# Patient Record
Sex: FEMALE | Race: WHITE | NOT HISPANIC OR LATINO | URBAN - METROPOLITAN AREA
[De-identification: names, ages, dates, MRNs, and addresses within clinical notes are randomized per-mention and may not be internally consistent; named-entity substitution may affect disease eponyms.]

---

## 2019-11-05 ENCOUNTER — INPATIENT (INPATIENT)
Facility: HOSPITAL | Age: 84
LOS: 2 days | Discharge: NURSING, REHAB OR LONG-TERM CARE | End: 2019-11-08
Attending: OBSTETRICS & GYNECOLOGY | Admitting: OBSTETRICS & GYNECOLOGY
Payer: MEDICARE

## 2019-11-05 ENCOUNTER — OUTPATIENT (OUTPATIENT)
Dept: OUTPATIENT SERVICES | Facility: HOSPITAL | Age: 84
LOS: 1 days | End: 2019-11-05

## 2019-11-05 ENCOUNTER — RESULT REVIEW (OUTPATIENT)
Age: 84
End: 2019-11-05

## 2019-11-05 ENCOUNTER — APPOINTMENT (OUTPATIENT)
Dept: OBGYN | Facility: HOSPITAL | Age: 84
End: 2019-11-05
Payer: MEDICARE

## 2019-11-05 VITALS
HEART RATE: 65 BPM | DIASTOLIC BLOOD PRESSURE: 61 MMHG | TEMPERATURE: 98 F | SYSTOLIC BLOOD PRESSURE: 176 MMHG | RESPIRATION RATE: 20 BRPM | OXYGEN SATURATION: 99 %

## 2019-11-05 VITALS
DIASTOLIC BLOOD PRESSURE: 57 MMHG | BODY MASS INDEX: 36.14 KG/M2 | WEIGHT: 204 LBS | HEIGHT: 63 IN | SYSTOLIC BLOOD PRESSURE: 158 MMHG | HEART RATE: 59 BPM

## 2019-11-05 DIAGNOSIS — N93.9 ABNORMAL UTERINE AND VAGINAL BLEEDING, UNSPECIFIED: ICD-10-CM

## 2019-11-05 DIAGNOSIS — N88.8 OTHER SPECIFIED NONINFLAMMATORY DISORDERS OF CERVIX UTERI: ICD-10-CM

## 2019-11-05 DIAGNOSIS — N95.0 POSTMENOPAUSAL BLEEDING: ICD-10-CM

## 2019-11-05 LAB
ALBUMIN SERPL ELPH-MCNC: 3.6 G/DL — SIGNIFICANT CHANGE UP (ref 3.3–5)
ALP SERPL-CCNC: 78 U/L — SIGNIFICANT CHANGE UP (ref 40–120)
ALT FLD-CCNC: 7 U/L — SIGNIFICANT CHANGE UP (ref 4–33)
ANION GAP SERPL CALC-SCNC: 13 MMO/L — SIGNIFICANT CHANGE UP (ref 7–14)
APTT BLD: 34.2 SEC — SIGNIFICANT CHANGE UP (ref 27.5–36.3)
AST SERPL-CCNC: 11 U/L — SIGNIFICANT CHANGE UP (ref 4–32)
BASOPHILS # BLD AUTO: 0.02 K/UL — SIGNIFICANT CHANGE UP (ref 0–0.2)
BASOPHILS NFR BLD AUTO: 0.2 % — SIGNIFICANT CHANGE UP (ref 0–2)
BILIRUB SERPL-MCNC: 0.4 MG/DL — SIGNIFICANT CHANGE UP (ref 0.2–1.2)
BLD GP AB SCN SERPL QL: NEGATIVE — SIGNIFICANT CHANGE UP
BUN SERPL-MCNC: 23 MG/DL — SIGNIFICANT CHANGE UP (ref 7–23)
CALCIUM SERPL-MCNC: 9.1 MG/DL — SIGNIFICANT CHANGE UP (ref 8.4–10.5)
CHLORIDE SERPL-SCNC: 99 MMOL/L — SIGNIFICANT CHANGE UP (ref 98–107)
CO2 SERPL-SCNC: 25 MMOL/L — SIGNIFICANT CHANGE UP (ref 22–31)
CREAT SERPL-MCNC: 0.92 MG/DL — SIGNIFICANT CHANGE UP (ref 0.5–1.3)
EOSINOPHIL # BLD AUTO: 0.03 K/UL — SIGNIFICANT CHANGE UP (ref 0–0.5)
EOSINOPHIL NFR BLD AUTO: 0.4 % — SIGNIFICANT CHANGE UP (ref 0–6)
GLUCOSE SERPL-MCNC: 333 MG/DL — HIGH (ref 70–99)
HCT VFR BLD CALC: 42.1 % — SIGNIFICANT CHANGE UP (ref 34.5–45)
HGB BLD-MCNC: 13.4 G/DL — SIGNIFICANT CHANGE UP (ref 11.5–15.5)
IMM GRANULOCYTES NFR BLD AUTO: 0.5 % — SIGNIFICANT CHANGE UP (ref 0–1.5)
INR BLD: 1.06 — SIGNIFICANT CHANGE UP (ref 0.88–1.17)
LYMPHOCYTES # BLD AUTO: 1.62 K/UL — SIGNIFICANT CHANGE UP (ref 1–3.3)
LYMPHOCYTES # BLD AUTO: 19.1 % — SIGNIFICANT CHANGE UP (ref 13–44)
MAGNESIUM SERPL-MCNC: 2 MG/DL — SIGNIFICANT CHANGE UP (ref 1.6–2.6)
MCHC RBC-ENTMCNC: 28.3 PG — SIGNIFICANT CHANGE UP (ref 27–34)
MCHC RBC-ENTMCNC: 31.8 % — LOW (ref 32–36)
MCV RBC AUTO: 88.8 FL — SIGNIFICANT CHANGE UP (ref 80–100)
MONOCYTES # BLD AUTO: 0.54 K/UL — SIGNIFICANT CHANGE UP (ref 0–0.9)
MONOCYTES NFR BLD AUTO: 6.4 % — SIGNIFICANT CHANGE UP (ref 2–14)
NEUTROPHILS # BLD AUTO: 6.22 K/UL — SIGNIFICANT CHANGE UP (ref 1.8–7.4)
NEUTROPHILS NFR BLD AUTO: 73.4 % — SIGNIFICANT CHANGE UP (ref 43–77)
NRBC # FLD: 0 K/UL — SIGNIFICANT CHANGE UP (ref 0–0)
PHOSPHATE SERPL-MCNC: 3.2 MG/DL — SIGNIFICANT CHANGE UP (ref 2.5–4.5)
PLATELET # BLD AUTO: 232 K/UL — SIGNIFICANT CHANGE UP (ref 150–400)
PMV BLD: 10.4 FL — SIGNIFICANT CHANGE UP (ref 7–13)
POTASSIUM SERPL-MCNC: 4.7 MMOL/L — SIGNIFICANT CHANGE UP (ref 3.5–5.3)
POTASSIUM SERPL-SCNC: 4.7 MMOL/L — SIGNIFICANT CHANGE UP (ref 3.5–5.3)
PROT SERPL-MCNC: 6.8 G/DL — SIGNIFICANT CHANGE UP (ref 6–8.3)
PROTHROM AB SERPL-ACNC: 11.8 SEC — SIGNIFICANT CHANGE UP (ref 9.8–13.1)
RBC # BLD: 4.74 M/UL — SIGNIFICANT CHANGE UP (ref 3.8–5.2)
RBC # FLD: 15.2 % — HIGH (ref 10.3–14.5)
RH IG SCN BLD-IMP: POSITIVE — SIGNIFICANT CHANGE UP
SODIUM SERPL-SCNC: 137 MMOL/L — SIGNIFICANT CHANGE UP (ref 135–145)
WBC # BLD: 8.47 K/UL — SIGNIFICANT CHANGE UP (ref 3.8–10.5)
WBC # FLD AUTO: 8.47 K/UL — SIGNIFICANT CHANGE UP (ref 3.8–10.5)

## 2019-11-05 PROCEDURE — 88360 TUMOR IMMUNOHISTOCHEM/MANUAL: CPT | Mod: 26

## 2019-11-05 PROCEDURE — 88341 IMHCHEM/IMCYTCHM EA ADD ANTB: CPT | Mod: 26,59

## 2019-11-05 PROCEDURE — 88342 IMHCHEM/IMCYTCHM 1ST ANTB: CPT | Mod: 26,59

## 2019-11-05 PROCEDURE — 88305 TISSUE EXAM BY PATHOLOGIST: CPT | Mod: 26

## 2019-11-05 PROCEDURE — ZZZZZ: CPT

## 2019-11-05 PROCEDURE — 99213 OFFICE O/P EST LOW 20 MIN: CPT | Mod: GC

## 2019-11-05 RX ORDER — ACETAMINOPHEN 500 MG
1000 TABLET ORAL ONCE
Refills: 0 | Status: COMPLETED | OUTPATIENT
Start: 2019-11-05 | End: 2019-11-05

## 2019-11-05 RX ORDER — GLUCAGON INJECTION, SOLUTION 0.5 MG/.1ML
1 INJECTION, SOLUTION SUBCUTANEOUS ONCE
Refills: 0 | Status: DISCONTINUED | OUTPATIENT
Start: 2019-11-05 | End: 2019-11-08

## 2019-11-05 RX ORDER — SODIUM CHLORIDE 9 MG/ML
1000 INJECTION, SOLUTION INTRAVENOUS
Refills: 0 | Status: DISCONTINUED | OUTPATIENT
Start: 2019-11-05 | End: 2019-11-08

## 2019-11-05 RX ORDER — DEXTROSE 50 % IN WATER 50 %
15 SYRINGE (ML) INTRAVENOUS ONCE
Refills: 0 | Status: DISCONTINUED | OUTPATIENT
Start: 2019-11-05 | End: 2019-11-08

## 2019-11-05 RX ORDER — ACETAMINOPHEN 500 MG
975 TABLET ORAL EVERY 6 HOURS
Refills: 0 | Status: DISCONTINUED | OUTPATIENT
Start: 2019-11-05 | End: 2019-11-08

## 2019-11-05 RX ORDER — METOPROLOL TARTRATE 50 MG
5 TABLET ORAL EVERY 6 HOURS
Refills: 0 | Status: DISCONTINUED | OUTPATIENT
Start: 2019-11-05 | End: 2019-11-06

## 2019-11-05 RX ORDER — INSULIN LISPRO 100/ML
VIAL (ML) SUBCUTANEOUS AT BEDTIME
Refills: 0 | Status: DISCONTINUED | OUTPATIENT
Start: 2019-11-05 | End: 2019-11-06

## 2019-11-05 RX ORDER — DEXTROSE 50 % IN WATER 50 %
25 SYRINGE (ML) INTRAVENOUS ONCE
Refills: 0 | Status: DISCONTINUED | OUTPATIENT
Start: 2019-11-05 | End: 2019-11-08

## 2019-11-05 RX ORDER — INSULIN LISPRO 100/ML
VIAL (ML) SUBCUTANEOUS
Refills: 0 | Status: DISCONTINUED | OUTPATIENT
Start: 2019-11-05 | End: 2019-11-06

## 2019-11-05 RX ORDER — INSULIN LISPRO 100/ML
VIAL (ML) SUBCUTANEOUS AT BEDTIME
Refills: 0 | Status: DISCONTINUED | OUTPATIENT
Start: 2019-11-05 | End: 2019-11-05

## 2019-11-05 RX ORDER — INSULIN LISPRO 100/ML
VIAL (ML) SUBCUTANEOUS EVERY 6 HOURS
Refills: 0 | Status: DISCONTINUED | OUTPATIENT
Start: 2019-11-05 | End: 2019-11-05

## 2019-11-05 RX ORDER — SODIUM CHLORIDE 9 MG/ML
1000 INJECTION, SOLUTION INTRAVENOUS
Refills: 0 | Status: DISCONTINUED | OUTPATIENT
Start: 2019-11-05 | End: 2019-11-07

## 2019-11-05 RX ORDER — DEXTROSE 50 % IN WATER 50 %
12.5 SYRINGE (ML) INTRAVENOUS ONCE
Refills: 0 | Status: DISCONTINUED | OUTPATIENT
Start: 2019-11-05 | End: 2019-11-08

## 2019-11-05 RX ORDER — INSULIN LISPRO 100/ML
VIAL (ML) SUBCUTANEOUS
Refills: 0 | Status: DISCONTINUED | OUTPATIENT
Start: 2019-11-05 | End: 2019-11-05

## 2019-11-05 RX ADMIN — Medication 400 MILLIGRAM(S): at 23:04

## 2019-11-05 RX ADMIN — SODIUM CHLORIDE 75 MILLILITER(S): 9 INJECTION, SOLUTION INTRAVENOUS at 21:55

## 2019-11-05 NOTE — ED PROVIDER NOTE - OBJECTIVE STATEMENT
89F with PMH T2DM, HTN, 6mo of postmenopausal bleeding who p/w vaginal bleeding following endometrial biopsy. pt saw gyn today for evaluation of bleeding, following bx pt noted bright blood in the toilet bowl. Pt sent to ED for further eval. pt denies dizziness, chest pain, sob, nausea, vomiting, fevers, chills. Pt immediately seen by obgyn/gynonc team on arrival for exam and evaluation. Denies ASA,, plavix, blood thinner use.

## 2019-11-05 NOTE — REVIEW OF SYSTEMS
[Pelvic Pain] : pelvic pain [Abn Vag Bleeding] : abnormal vaginal bleeding [Frequency] : frequency [Nl] : Integumentary

## 2019-11-05 NOTE — ED PROVIDER NOTE - PHYSICAL EXAMINATION
Gen: NAD, AOx3  Head: NCAT  HEENT: PERRL, oral mucosa moist, normal conjunctiva, oropharynx clear without exudate or erythema  Lung: CTAB, no respiratory distress, no wheezing, rales, rhonchi  CV: normal s1/s2, rrr, no murmurs, Normal perfusion, pulses 2+ throughout  Abd: soft, NTND, no CVA tenderness  MSK: No edema, no visible deformities, full range of motion in all 4 extremities  Neuro: CN II-XII grossly intact, No focal neurologic deficits  Skin: No rash   Psych: normal affect  : vaginal exam performed by obgyn team tha Gen: NAD, AOx3  Head: NCAT  HEENT: PERRL, oral mucosa moist, normal conjunctiva  Lung: CTAB, no respiratory distress, no wheezing, rales, rhonchi  CV: normal s1/s2, rrr, no murmurs, Normal perfusion  Abd: soft, NTND, no CVA tenderness  MSK: 2+ edema b/l LE no visible deformities, full range of motion in all 4 extremities  Neuro: CN II-XII grossly intact, No focal neurologic deficits  Skin: No rash   Psych: normal affect  : vaginal exam performed by obgyn team Dr. Lozada; active bleeding with clots noted; packing placed by obgyn

## 2019-11-05 NOTE — ED ADULT NURSE NOTE - NSIMPLEMENTINTERV_GEN_ALL_ED
Implemented All Universal Safety Interventions:  [place] to call system. Call bell, personal items and telephone within reach. Instruct patient to call for assistance. Room bathroom lighting operational. Non-slip footwear when patient is off stretcher. Physically safe environment: no spills, clutter or unnecessary equipment. Stretcher in lowest position, wheels locked, appropriate side rails in place. Implemented All Fall with Harm Risk Interventions:  [place] to call system. Call bell, personal items and telephone within reach. Instruct patient to call for assistance. Room bathroom lighting operational. Non-slip footwear when patient is off stretcher. Physically safe environment: no spills, clutter or unnecessary equipment. Stretcher in lowest position, wheels locked, appropriate side rails in place. Provide visual cue, wrist band, yellow gown, etc. Monitor gait and stability. Monitor for mental status changes and reorient to person, place, and time. Review medications for side effects contributing to fall risk. Reinforce activity limits and safety measures with patient and family. Provide visual clues: red socks.

## 2019-11-05 NOTE — ED PROVIDER NOTE - PMH
Benign Hypertension    Breast Cancer  at 50 yo and 38 yo, bilateral mastectomy  CAD (coronary artery disease)    CVA (Cerebral Infarction)  1997 - no residual symptoms  Diabetes Mellitus    History of Obesity    HTN (hypertension)

## 2019-11-05 NOTE — H&P ADULT - PROBLEM SELECTOR PLAN 1
- Vaginal packing and grullon catheter placed at bedside  - CBC, PT/INR, PTT, Fibrinogen, BMP w/ Mg & Phos STAT  - Pending Cr, CT Abdomen/Pelvis with IV contrast and CT Chest w/o contrast to evaluate  - NPO  - IV Lopressor for HTN; ISS for T2DM  - f/u outpatient biopsy results    Pt seen and examined with Neo, PGY-5 Gyn Onc Fellow; d/w Dr. Yosvany Kirkland, PGY-4  #[id number] - Vaginal packing and grullon catheter placed at bedside  - CBC, PT/INR, PTT, Fibrinogen, BMP w/ Mg & Phos, T&S STAT  - Pending Cr, CT Abdomen/Pelvis with IV contrast and CT Chest w/o contrast to evaluate  - NPO  - IV Lopressor for HTN; ISS for T2DM  - f/u outpatient biopsy results    Pt seen and examined with Neo, PGY-5 Gyn Onc Fellow; d/w Dr. Yosvany Kirkland, PGY-4  #[id number] - Vaginal packing and grullon catheter placed at bedside  - CBC, PT/INR, PTT, BMP w/ Mg & Phos, T&S STAT  - Pending Cr, CT Abdomen/Pelvis with IV contrast and CT Chest w/o contrast to evaluate  - NPO  - IV Lopressor for HTN; ISS for T2DM  - f/u outpatient biopsy results  - AM CBC/BMP  - SCDs for DVT ppx    Pt seen and examined with Neo, PGY-5 Gyn Onc Fellow; d/w Dr. Yosvany Kirkland, PGY-4  #[id number]

## 2019-11-05 NOTE — ED ADULT NURSE NOTE - NSFALLRSKPASTHIST_ED_ALL_ED
-Psychiatry consulted  -Constant observation ordered no -Psychiatry consulted  -Constant observation ordered

## 2019-11-05 NOTE — CHART NOTE - NSCHARTNOTEFT_GEN_A_CORE
Patient seen and examined for continued bleeding. Pt asymptomatic    VS  T(C): 36.6 (11-05-19 @ 20:50)  HR: 74 (11-05-19 @ 21:15)  BP: 188/70 (11-05-19 @ 21:15)  RR: 18 (11-05-19 @ 21:15)  SpO2: 100% (11-05-19 @ 21:15)    Pelvic Exam: Saturated vaginal packing with bleeding around packing onto bharat, roughly 200 mL in total.    P:  - Vaginal packing removed, Surgicel placed over mass, and 1.5x vaginal packing re-inserted (plan was for 2x vaginal packing but pt could not tolerate full 2nd packing strip).  - CBC at 12a  - Re-assess bleeding in 1-2 hours    Pt seen and examined with Neo, PGY-5 Gyn Onc Fellow  Mati Kirkland, PGY-4  #[id number] Patient seen and examined for continued bleeding. Pt asymptomatic    VS  T(C): 36.6 (11-05-19 @ 20:50)  HR: 74 (11-05-19 @ 21:15)  BP: 188/70 (11-05-19 @ 21:15)  RR: 18 (11-05-19 @ 21:15)  SpO2: 100% (11-05-19 @ 21:15)    Pelvic Exam: Saturated vaginal packing with bleeding around packing onto bharat, roughly 200 mL in total.               13.4   8.47  )-----------( 232      ( 11-05 @ 20:45 )             42.1     P:  - Vaginal packing removed, Surgicel placed over mass, and 1.5x vaginal packing re-inserted (plan was for 2x vaginal packing but pt could not tolerate full 2nd packing strip).  - CBC at 12a  - Re-assess bleeding in 1-2 hours    Pt seen and examined with Neo PGY-5 Gyn Onc Fellow  Mati Kirkland, PGY-4  #[id number]

## 2019-11-05 NOTE — ED PROVIDER NOTE - PSH
Ankle Injury  left ankle surgery  Fracture of shaft of tibia and fibula  right  History of Mastectomy  1969 and 1979  S/P PTCA (percutaneous transluminal coronary angioplasty)

## 2019-11-05 NOTE — H&P ADULT - NSHPPHYSICALEXAM_GEN_ALL_CORE
VS  T(C): 36.4 (11-05-19 @ 19:01)  HR: 65 (11-05-19 @ 19:01)  BP: 176/61 (11-05-19 @ 19:01)  RR: 20 (11-05-19 @ 19:01)  SpO2: 99% (11-05-19 @ 19:01)    Physical Exam:  General: NAD  Abdomen: Soft, non-tender, non-distended  Pelvic: Blood filled vaginal vault with bright red bleeding, mass noted at vaginal apex and palpated on bimanual on posterior portion of the cervix. Exact source of bleeding could not be localized but appeared to be emanating from the mass. Roughly 150 mL of blood and clot removed from vagina.

## 2019-11-05 NOTE — ED PROVIDER NOTE - CLINICAL SUMMARY MEDICAL DECISION MAKING FREE TEXT BOX
89F with postmenopausal bleeding with worsening vaginal bleed after endometrial biopsy today; packing placed by obgyn team. Will check CBC CMP T&S and coags. Admit to obgyn

## 2019-11-05 NOTE — H&P ADULT - HISTORY OF PRESENT ILLNESS
89 y/o G0 LMP 1979 presenting from Gyn clinic for significant vaginal bleeding. Pt originally presented to gyn clinic with postmenopausal bleeding that she began to note over the past year. She had 6 episodes during this time period, most recently 10/31-11/3. Bleeding is initially heavy with cramping and then decreases to spotting. She had a CT scan 18 years ago that showed a mass in her uterus that she was supposed to undergo a hysterectomy for but the patient reports that the case was aborted when she was on the operating table because "her numbers were wacky." Patient reports she had a TAUS earlier this year at her nursing home that was reportedly normal per the patient. In gyn clinic today on exam a "4-5 cm amorphous mass was noted to be emanating from the posterior vagina, possibly from the cervix" A portion mass was removed and sent for tissue biopsy which resulted in brisk bleeding from the mass. Pressure was applied and bleeding subsided, vitals were stable, but when pt when to go use the restroom she had been noted to soak through 1-2 pads. Pt was still asymptomatic and vitals stable but due to continued bleeding she was advised to come to the emergency room. Here in the ED she reports some dizziness, but otherwise denies symptoms including CP, SOB, N/V, palpitations.  POBhx: G0  PGynhx: "mass in uterus", only had a few pap smears in her lifetime  PMH: Breast cancer 1968, 1978 s/p R radical mastectomy and L modified mastectomy, uncontrolled T2DM c/b neuropathy and recurrent non-healing ulcers of LE, anxiety, HTN, PVD, GERD, overactive bladder, OA, cardiac stent  PSH: none  FH: denies family history of breast, ovarian, uterine, cervical, colon cancer  Meds: does not know the names  All: none  Social: Lives in a nursing home, used to smoke 3 packs/day for 50 years

## 2019-11-05 NOTE — H&P ADULT - ASSESSMENT
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

## 2019-11-05 NOTE — ED ADULT NURSE NOTE - OBJECTIVE STATEMENT
89 yo F AAOx4 received to room 9 for intermittent vag bleeding x a few months, reports in the last 2 weeks its been worse, + clots, denies dizziness/weakness, resides in NH, +3 pitting edema to b/l LE, 14 fr grullon placed by OBGYN resident with clear, yellow urine draining to gravity at bedside, VS as charted, denies any pain, 20G placed to R wrist, lab work sent, no further interventions at this time, will monitor

## 2019-11-06 DIAGNOSIS — N95.0 POSTMENOPAUSAL BLEEDING: ICD-10-CM

## 2019-11-06 LAB
ANION GAP SERPL CALC-SCNC: 8 MMO/L — SIGNIFICANT CHANGE UP (ref 7–14)
BASOPHILS # BLD AUTO: 0.03 K/UL — SIGNIFICANT CHANGE UP (ref 0–0.2)
BASOPHILS # BLD AUTO: 0.03 K/UL — SIGNIFICANT CHANGE UP (ref 0–0.2)
BASOPHILS NFR BLD AUTO: 0.3 % — SIGNIFICANT CHANGE UP (ref 0–2)
BASOPHILS NFR BLD AUTO: 0.3 % — SIGNIFICANT CHANGE UP (ref 0–2)
BUN SERPL-MCNC: 22 MG/DL — SIGNIFICANT CHANGE UP (ref 7–23)
CALCIUM SERPL-MCNC: 9.2 MG/DL — SIGNIFICANT CHANGE UP (ref 8.4–10.5)
CHLORIDE SERPL-SCNC: 100 MMOL/L — SIGNIFICANT CHANGE UP (ref 98–107)
CO2 SERPL-SCNC: 29 MMOL/L — SIGNIFICANT CHANGE UP (ref 22–31)
CREAT SERPL-MCNC: 0.8 MG/DL — SIGNIFICANT CHANGE UP (ref 0.5–1.3)
EOSINOPHIL # BLD AUTO: 0.04 K/UL — SIGNIFICANT CHANGE UP (ref 0–0.5)
EOSINOPHIL # BLD AUTO: 0.05 K/UL — SIGNIFICANT CHANGE UP (ref 0–0.5)
EOSINOPHIL NFR BLD AUTO: 0.4 % — SIGNIFICANT CHANGE UP (ref 0–6)
EOSINOPHIL NFR BLD AUTO: 0.5 % — SIGNIFICANT CHANGE UP (ref 0–6)
GLUCOSE SERPL-MCNC: 229 MG/DL — HIGH (ref 70–99)
HCT VFR BLD CALC: 35.6 % — SIGNIFICANT CHANGE UP (ref 34.5–45)
HCT VFR BLD CALC: 36.5 % — SIGNIFICANT CHANGE UP (ref 34.5–45)
HCT VFR BLD CALC: 39.2 % — SIGNIFICANT CHANGE UP (ref 34.5–45)
HGB BLD-MCNC: 11 G/DL — LOW (ref 11.5–15.5)
HGB BLD-MCNC: 11.7 G/DL — SIGNIFICANT CHANGE UP (ref 11.5–15.5)
HGB BLD-MCNC: 11.9 G/DL — SIGNIFICANT CHANGE UP (ref 11.5–15.5)
IMM GRANULOCYTES NFR BLD AUTO: 0.5 % — SIGNIFICANT CHANGE UP (ref 0–1.5)
IMM GRANULOCYTES NFR BLD AUTO: 0.6 % — SIGNIFICANT CHANGE UP (ref 0–1.5)
LYMPHOCYTES # BLD AUTO: 1.53 K/UL — SIGNIFICANT CHANGE UP (ref 1–3.3)
LYMPHOCYTES # BLD AUTO: 1.58 K/UL — SIGNIFICANT CHANGE UP (ref 1–3.3)
LYMPHOCYTES # BLD AUTO: 14.6 % — SIGNIFICANT CHANGE UP (ref 13–44)
LYMPHOCYTES # BLD AUTO: 15.7 % — SIGNIFICANT CHANGE UP (ref 13–44)
MAGNESIUM SERPL-MCNC: 2 MG/DL — SIGNIFICANT CHANGE UP (ref 1.6–2.6)
MCHC RBC-ENTMCNC: 27.5 PG — SIGNIFICANT CHANGE UP (ref 27–34)
MCHC RBC-ENTMCNC: 27.7 PG — SIGNIFICANT CHANGE UP (ref 27–34)
MCHC RBC-ENTMCNC: 28.7 PG — SIGNIFICANT CHANGE UP (ref 27–34)
MCHC RBC-ENTMCNC: 30.4 % — LOW (ref 32–36)
MCHC RBC-ENTMCNC: 30.9 % — LOW (ref 32–36)
MCHC RBC-ENTMCNC: 32.1 % — SIGNIFICANT CHANGE UP (ref 32–36)
MCV RBC AUTO: 89.5 FL — SIGNIFICANT CHANGE UP (ref 80–100)
MCV RBC AUTO: 89.7 FL — SIGNIFICANT CHANGE UP (ref 80–100)
MCV RBC AUTO: 90.7 FL — SIGNIFICANT CHANGE UP (ref 80–100)
MONOCYTES # BLD AUTO: 0.68 K/UL — SIGNIFICANT CHANGE UP (ref 0–0.9)
MONOCYTES # BLD AUTO: 0.88 K/UL — SIGNIFICANT CHANGE UP (ref 0–0.9)
MONOCYTES NFR BLD AUTO: 7 % — SIGNIFICANT CHANGE UP (ref 2–14)
MONOCYTES NFR BLD AUTO: 8.1 % — SIGNIFICANT CHANGE UP (ref 2–14)
NEUTROPHILS # BLD AUTO: 7.42 K/UL — HIGH (ref 1.8–7.4)
NEUTROPHILS # BLD AUTO: 8.25 K/UL — HIGH (ref 1.8–7.4)
NEUTROPHILS NFR BLD AUTO: 75.9 % — SIGNIFICANT CHANGE UP (ref 43–77)
NEUTROPHILS NFR BLD AUTO: 76.1 % — SIGNIFICANT CHANGE UP (ref 43–77)
NRBC # FLD: 0 K/UL — SIGNIFICANT CHANGE UP (ref 0–0)
PHOSPHATE SERPL-MCNC: 3 MG/DL — SIGNIFICANT CHANGE UP (ref 2.5–4.5)
PLATELET # BLD AUTO: 203 K/UL — SIGNIFICANT CHANGE UP (ref 150–400)
PLATELET # BLD AUTO: 211 K/UL — SIGNIFICANT CHANGE UP (ref 150–400)
PLATELET # BLD AUTO: 213 K/UL — SIGNIFICANT CHANGE UP (ref 150–400)
PMV BLD: 10.2 FL — SIGNIFICANT CHANGE UP (ref 7–13)
PMV BLD: 10.4 FL — SIGNIFICANT CHANGE UP (ref 7–13)
PMV BLD: 10.8 FL — SIGNIFICANT CHANGE UP (ref 7–13)
POTASSIUM SERPL-MCNC: 5 MMOL/L — SIGNIFICANT CHANGE UP (ref 3.5–5.3)
POTASSIUM SERPL-SCNC: 5 MMOL/L — SIGNIFICANT CHANGE UP (ref 3.5–5.3)
RBC # BLD: 3.97 M/UL — SIGNIFICANT CHANGE UP (ref 3.8–5.2)
RBC # BLD: 4.08 M/UL — SIGNIFICANT CHANGE UP (ref 3.8–5.2)
RBC # BLD: 4.32 M/UL — SIGNIFICANT CHANGE UP (ref 3.8–5.2)
RBC # FLD: 15.2 % — HIGH (ref 10.3–14.5)
RBC # FLD: 15.2 % — HIGH (ref 10.3–14.5)
RBC # FLD: 15.3 % — HIGH (ref 10.3–14.5)
SODIUM SERPL-SCNC: 137 MMOL/L — SIGNIFICANT CHANGE UP (ref 135–145)
WBC # BLD: 10.83 K/UL — HIGH (ref 3.8–10.5)
WBC # BLD: 13.77 K/UL — HIGH (ref 3.8–10.5)
WBC # BLD: 9.77 K/UL — SIGNIFICANT CHANGE UP (ref 3.8–10.5)
WBC # FLD AUTO: 10.83 K/UL — HIGH (ref 3.8–10.5)
WBC # FLD AUTO: 13.77 K/UL — HIGH (ref 3.8–10.5)
WBC # FLD AUTO: 9.77 K/UL — SIGNIFICANT CHANGE UP (ref 3.8–10.5)

## 2019-11-06 PROCEDURE — 71260 CT THORAX DX C+: CPT | Mod: 26

## 2019-11-06 PROCEDURE — 36247 INS CATH ABD/L-EXT ART 3RD: CPT

## 2019-11-06 PROCEDURE — 76937 US GUIDE VASCULAR ACCESS: CPT | Mod: 26

## 2019-11-06 PROCEDURE — 74177 CT ABD & PELVIS W/CONTRAST: CPT | Mod: 26

## 2019-11-06 PROCEDURE — 36248 INS CATH ABD/L-EXT ART ADDL: CPT

## 2019-11-06 PROCEDURE — 99233 SBSQ HOSP IP/OBS HIGH 50: CPT

## 2019-11-06 PROCEDURE — 37244 VASC EMBOLIZE/OCCLUDE BLEED: CPT

## 2019-11-06 RX ORDER — DEXTROSE 50 % IN WATER 50 %
25 SYRINGE (ML) INTRAVENOUS ONCE
Refills: 0 | Status: DISCONTINUED | OUTPATIENT
Start: 2019-11-06 | End: 2019-11-08

## 2019-11-06 RX ORDER — ONDANSETRON 8 MG/1
4 TABLET, FILM COATED ORAL EVERY 6 HOURS
Refills: 0 | Status: DISCONTINUED | OUTPATIENT
Start: 2019-11-06 | End: 2019-11-06

## 2019-11-06 RX ORDER — HYDRALAZINE HCL 50 MG
5 TABLET ORAL ONCE
Refills: 0 | Status: COMPLETED | OUTPATIENT
Start: 2019-11-06 | End: 2019-11-06

## 2019-11-06 RX ORDER — METOPROLOL TARTRATE 50 MG
5 TABLET ORAL EVERY 6 HOURS
Refills: 0 | Status: DISCONTINUED | OUTPATIENT
Start: 2019-11-06 | End: 2019-11-07

## 2019-11-06 RX ORDER — ONDANSETRON 8 MG/1
4 TABLET, FILM COATED ORAL ONCE
Refills: 0 | Status: DISCONTINUED | OUTPATIENT
Start: 2019-11-06 | End: 2019-11-08

## 2019-11-06 RX ORDER — INSULIN LISPRO 100/ML
VIAL (ML) SUBCUTANEOUS
Refills: 0 | Status: DISCONTINUED | OUTPATIENT
Start: 2019-11-06 | End: 2019-11-08

## 2019-11-06 RX ORDER — NALOXONE HYDROCHLORIDE 4 MG/.1ML
0.1 SPRAY NASAL
Refills: 0 | Status: DISCONTINUED | OUTPATIENT
Start: 2019-11-06 | End: 2019-11-06

## 2019-11-06 RX ORDER — INSULIN LISPRO 100/ML
VIAL (ML) SUBCUTANEOUS AT BEDTIME
Refills: 0 | Status: DISCONTINUED | OUTPATIENT
Start: 2019-11-06 | End: 2019-11-08

## 2019-11-06 RX ORDER — METOPROLOL TARTRATE 50 MG
5 TABLET ORAL ONCE
Refills: 0 | Status: COMPLETED | OUTPATIENT
Start: 2019-11-06 | End: 2019-11-06

## 2019-11-06 RX ORDER — INSULIN LISPRO 100/ML
4 VIAL (ML) SUBCUTANEOUS ONCE
Refills: 0 | Status: COMPLETED | OUTPATIENT
Start: 2019-11-06 | End: 2019-11-06

## 2019-11-06 RX ORDER — GLUCAGON INJECTION, SOLUTION 0.5 MG/.1ML
1 INJECTION, SOLUTION SUBCUTANEOUS ONCE
Refills: 0 | Status: DISCONTINUED | OUTPATIENT
Start: 2019-11-06 | End: 2019-11-08

## 2019-11-06 RX ORDER — DIPHENHYDRAMINE HCL 50 MG
25 CAPSULE ORAL EVERY 4 HOURS
Refills: 0 | Status: DISCONTINUED | OUTPATIENT
Start: 2019-11-06 | End: 2019-11-06

## 2019-11-06 RX ORDER — DIPHENHYDRAMINE HCL 50 MG
25 CAPSULE ORAL ONCE
Refills: 0 | Status: DISCONTINUED | OUTPATIENT
Start: 2019-11-06 | End: 2019-11-06

## 2019-11-06 RX ORDER — SODIUM CHLORIDE 9 MG/ML
1000 INJECTION, SOLUTION INTRAVENOUS
Refills: 0 | Status: DISCONTINUED | OUTPATIENT
Start: 2019-11-06 | End: 2019-11-08

## 2019-11-06 RX ORDER — HYDROMORPHONE HYDROCHLORIDE 2 MG/ML
0.5 INJECTION INTRAMUSCULAR; INTRAVENOUS; SUBCUTANEOUS
Refills: 0 | Status: DISCONTINUED | OUTPATIENT
Start: 2019-11-06 | End: 2019-11-06

## 2019-11-06 RX ORDER — DEXTROSE 50 % IN WATER 50 %
12.5 SYRINGE (ML) INTRAVENOUS ONCE
Refills: 0 | Status: DISCONTINUED | OUTPATIENT
Start: 2019-11-06 | End: 2019-11-08

## 2019-11-06 RX ORDER — DEXMEDETOMIDINE HYDROCHLORIDE IN 0.9% SODIUM CHLORIDE 4 UG/ML
0.5 INJECTION INTRAVENOUS
Qty: 200 | Refills: 0 | Status: DISCONTINUED | OUTPATIENT
Start: 2019-11-06 | End: 2019-11-06

## 2019-11-06 RX ORDER — INSULIN GLARGINE 100 [IU]/ML
5 INJECTION, SOLUTION SUBCUTANEOUS AT BEDTIME
Refills: 0 | Status: DISCONTINUED | OUTPATIENT
Start: 2019-11-06 | End: 2019-11-08

## 2019-11-06 RX ORDER — HYDROMORPHONE HYDROCHLORIDE 2 MG/ML
0.5 INJECTION INTRAMUSCULAR; INTRAVENOUS; SUBCUTANEOUS
Refills: 0 | Status: DISCONTINUED | OUTPATIENT
Start: 2019-11-06 | End: 2019-11-07

## 2019-11-06 RX ORDER — DEXTROSE 50 % IN WATER 50 %
15 SYRINGE (ML) INTRAVENOUS ONCE
Refills: 0 | Status: DISCONTINUED | OUTPATIENT
Start: 2019-11-06 | End: 2019-11-08

## 2019-11-06 RX ORDER — HYDROMORPHONE HYDROCHLORIDE 2 MG/ML
30 INJECTION INTRAMUSCULAR; INTRAVENOUS; SUBCUTANEOUS
Refills: 0 | Status: DISCONTINUED | OUTPATIENT
Start: 2019-11-06 | End: 2019-11-06

## 2019-11-06 RX ADMIN — Medication 5 MILLIGRAM(S): at 05:42

## 2019-11-06 RX ADMIN — Medication 4 UNIT(S): at 00:56

## 2019-11-06 RX ADMIN — Medication 4: at 10:17

## 2019-11-06 RX ADMIN — Medication 5 MILLIGRAM(S): at 11:53

## 2019-11-06 RX ADMIN — HYDROMORPHONE HYDROCHLORIDE 0.5 MILLIGRAM(S): 2 INJECTION INTRAMUSCULAR; INTRAVENOUS; SUBCUTANEOUS at 22:30

## 2019-11-06 RX ADMIN — Medication 5 MILLIGRAM(S): at 00:55

## 2019-11-06 RX ADMIN — DEXMEDETOMIDINE HYDROCHLORIDE IN 0.9% SODIUM CHLORIDE 11.36 MICROGRAM(S)/KG/HR: 4 INJECTION INTRAVENOUS at 22:48

## 2019-11-06 RX ADMIN — Medication 5 MILLIGRAM(S): at 01:57

## 2019-11-06 RX ADMIN — Medication 5 MILLIGRAM(S): at 17:16

## 2019-11-06 RX ADMIN — Medication 2: at 13:29

## 2019-11-06 NOTE — CHART NOTE - NSCHARTNOTEFT_GEN_A_CORE
R4 GYN ONC Progress Note      Patient seen and examined at bedside in [facility].  Complains of leaking grullon and of headache.  Patient denies further bleeding after old blood and clots noted on movement of patient.  Patient counseled for uterine artery embolization.  Patient declines at this time as she does not wish to make medical decisions without her niece.  Offered to speak to niece on phone, patient did not give permission as she is worried her niece will lose her job if she has too many phone calls.  Patient also perceives her bleeding as improved and would rather wait until tomorrow.  Counseled patient that waiting for embolization puts her at risk for hemorrhage.  Patient understands and continues to decline.    Vital Signs Last 24 Hours  T(C): 36.4 (11-06-19 @ 10:14), Max: 36.6 (11-05-19 @ 20:50)  HR: 68 (11-06-19 @ 10:14) (65 - 74)  BP: 203/78 (11-06-19 @ 10:14) (169/79 - 234/95)  RR: 17 (11-06-19 @ 10:14) (17 - 76)  SpO2: 99% (11-06-19 @ 10:14) (97% - 100%)    I&O's Summary    05 Nov 2019 07:01  -  06 Nov 2019 07:00  --------------------------------------------------------  IN: 0 mL / OUT: 900 mL / NET: -900 mL        Physical Exam:  General: NAD  CV: NR, RR, S1, S2, no M/R/G  Lungs: CTA-B  Abdomen: Soft, appropriate post-operative tenderness, non-distended, +BS  Incision: _ CDI  Ext: No pain or swelling    Labs:                        11.7   10.83 )-----------( 211      ( 06 Nov 2019 05:51 )             36.5   baso 0.3    eos 0.4    imm gran 0.5    lymph 14.6   mono 8.1    poly 76.1                         11.0   9.77  )-----------( 203      ( 06 Nov 2019 00:56 )             35.6   baso 0.3    eos 0.5    imm gran 0.6    lymph 15.7   mono 7.0    poly 75.9                         13.4   8.47  )-----------( 232      ( 05 Nov 2019 20:45 )             42.1   baso 0.2    eos 0.4    imm gran 0.5    lymph 19.1   mono 6.4    poly 73.4     11-06    137  |  100  |  22  ----------------------------<  229<H>  5.0   |  29  |  0.80    Ca    9.2      06 Nov 2019 05:52  Phos  3.0     11-06  Mg     2.0     11-06    TPro  6.8  /  Alb  3.6  /  TBili  0.4  /  DBili  x   /  AST  11  /  ALT  7   /  AlkPhos  78  11-05    PT/INR - ( 05 Nov 2019 20:45 )   PT: 11.8 SEC;   INR: 1.06          PTT - ( 05 Nov 2019 20:45 )  PTT:34.2 SEC      Blood Type: O Positive      MEDICATIONS  (STANDING):  dextrose 5%. 1000 milliLiter(s) (50 mL/Hr) IV Continuous <Continuous>  dextrose 50% Injectable 12.5 Gram(s) IV Push once  dextrose 50% Injectable 25 Gram(s) IV Push once  dextrose 50% Injectable 25 Gram(s) IV Push once  hydrALAZINE Injectable 5 milliGRAM(s) IV Push once  insulin lispro (HumaLOG) corrective regimen sliding scale   SubCutaneous at bedtime  insulin lispro (HumaLOG) corrective regimen sliding scale   SubCutaneous three times a day before meals  lactated ringers. 1000 milliLiter(s) (75 mL/Hr) IV Continuous <Continuous>  metoprolol tartrate Injectable 5 milliGRAM(s) IV Push every 6 hours    MEDICATIONS  (PRN):  acetaminophen   Tablet .. 975 milliGRAM(s) Oral every 6 hours PRN Mild Pain (1 - 3)  dextrose 40% Gel 15 Gram(s) Oral once PRN Blood Glucose LESS THAN 70 milliGRAM(s)/deciliter  glucagon  Injectable 1 milliGRAM(s) IntraMuscular once PRN Glucose LESS THAN 70 milligrams/deciliter    PLAN:  -Continue to closely monitor bleeding and vital signs  -flush grullon, replace with larger grullon if continues to leak  -NPO/IVF  -hydralazine 5 mg IVP for hypertension    dw Dr Ibis Courtney PGY4

## 2019-11-06 NOTE — PROGRESS NOTE ADULT - ASSESSMENT
89 yo HD 1 of admission fo vaginal bleeding s/p in office biopsy of fungating cervical mass.  Patient is currently stable with vaginal packing in place.  AM labs stable.

## 2019-11-06 NOTE — PROGRESS NOTE ADULT - SUBJECTIVE AND OBJECTIVE BOX
Vascular & Interventional Radiology Post-Procedure Note    Pre-Procedure Diagnosis: Uterine/Cervical Mass  Post-Procedure Diagnosis: Same as pre.  Indications for Procedure: Hemorrhage from mass    : Kvng YOU, Shani YOU    Procedure Details/Findings: Access via R-CFA. Left uterine artery embolization with usage of 700um embozene particles. Right obturator artery coil blockade performed and subsequently the right internal iliac artery was embolized using avitene slurry. Closure with mynx device. No hematoma. R Groin skin shearing noted. Placed nonadherent dressing to cover site.     Complications: None  Estimated Blood Loss: Minimal  Specimen: N/A  Contrast: See Report  Sedation: MAC  Patient Condition/Disposition: Stable. IRS then Floor    Plan:     Recommend wound care evaluation for R Groin wound.

## 2019-11-06 NOTE — PROGRESS NOTE ADULT - PROBLEM SELECTOR PLAN 1
Neuro: c/w oral analgesia - tylenol as needed  CV: hemodynamically stable but hypertensive, continue lopressor, resume home antihypertensives when no longer NPO  Pulm: saturating well on room air, encourage incentive spirometry and ambulation  GI: NPO pending possible intervention  : adequate UOP, LR@75  Heme: ambulation, SCDs, for DVT ppx.  AM H/H stable.  Closely monitor vaginal bleeding.    Patient seen and examined with Dr Ibis Courtney PGY4

## 2019-11-06 NOTE — CHART NOTE - NSCHARTNOTEFT_GEN_A_CORE
Patient re-evaluated, asymptomatic at this time.    Vital Signs Last 24 Hours  T(C): 36.4 (11-06-19 @ 01:43), Max: 36.6 (11-05-19 @ 20:50)  HR: 66 (11-06-19 @ 02:06) (65 - 74)  BP: 169/79 (11-06-19 @ 02:06) (169/79 - 234/95)  RR: 18 (11-06-19 @ 02:06) (18 - 76)  SpO2: 97% (11-06-19 @ 02:06) (97% - 100%)    Vaginal exam: Some additional blood on pad and packing noted to be saturated, but no active bleeding noted around packing at this time               11.0   9.77  )-----------( 203      ( 11-06 @ 00:56 )             35.6                13.4   8.47  )-----------( 232      ( 11-05 @ 20:45 )             42.1     < from: CT Chest w/ IV Cont (11.06.19 @ 00:58) >    FINDINGS:    CHEST:     LUNGS AND LARGE AIRWAYS: Patent central airways. Emphysema. Scattered   areas of atelectasis noted throughout the lungs. Tiny juxtapleural right   upper lobe nodule (2:32) measures 1 mm.  PLEURA: No pleural effusion.  VESSELS: Atherosclerotic changes of the aorta and coronary arteries.  HEART: Heart size is normal. No pericardial effusion.  MEDIASTINUM AND WILLIAM: No lymphadenopathy.  CHEST WALL AND LOWER NECK: Partially imaged lipoma in the posterior soft   tissues of the neck measuring up to 15 cm. Subcentimeter hypodense left   thyroid nodule.    ABDOMEN AND PELVIS:    LIVER: Within normal limits.  BILE DUCTS: Normal caliber.  GALLBLADDER: Cholelithiasis.  SPLEEN: Within normal limits.  PANCREAS: Within normal limits.  ADRENALS: 1 cm indeterminate left adrenal nodule.  KIDNEYS/URETERS: Bilateral cysts. No hydronephrosis.    BLADDER: Soler catheter. Air likely secondary to instrumentation.  REPRODUCTIVE ORGANS: Fibroid uterus. High density material within the   distended vagina likely a combination of blood products and vaginal   packing. A densely calcified mass in the anterior left abdomen, separate   from the uterus measuring 6.1 x 3.7 cm, which appears to drain via the   left gonadal vein and may represent calcified ovary.    BOWEL: No bowel obstruction.Appendix is normal.  PERITONEUM: No ascites.   VESSELS: Atherosclerotic changes.  RETROPERITONEUM/LYMPH NODES: No lymphadenopathy.    ABDOMINAL WALL: Within normal limits.  BONES: Degenerative changes.    IMPRESSION:   No acute finding within the chest.    Distended vagina with packing material. No evidence of IV contrast   extravasation to suggest active bleeding. Enlarged fibroid uterus. Biopsy   mass is cannot delineated on this exam and would be better evaluated with   pelvic MRI as clinically warranted.    < end of copied text >    A:  Continued but improved bleeding, H&H drop appropriate given amount of bleeding earlier in evening, clinically bleeding seems to be decreasing  P:  - CBC/BMP/Mg/Phos at 5 am  - If significant further drop in CBC, will consider UAE to help decrease bleeding  - Systolic BPs 230s-250s when at bedside, additional 5 mg Lopressor given IV with improvement in BPs  - Etiology of mass unclear on CT scan, consider further imaging when bleeding is controlled; f/u outpatient biopsy    Pt seen and examined with Neo, PGY-5 Gyn Onc Fellow  Mati Kirkland, PGY-4  #[id number]

## 2019-11-06 NOTE — PROGRESS NOTE ADULT - SUBJECTIVE AND OBJECTIVE BOX
Vascular & Interventional Radiology Pre-Procedure Note    Procedure Name: Uterine Artery Embolization    HPI: 89y Female with fungating uterine/cervical mass and hemorrhage.     Allergies:   Medications:  hydrALAZINE Injectable: 5 milliGRAM(s) IV Push (11-06 @ 11:53)  metoprolol tartrate Injectable: 5 milliGRAM(s) IV Push (11-06 @ 01:57)  metoprolol tartrate Injectable: 5 milliGRAM(s) IV Push (11-06 @ 05:42)    Data:    T(C): 36.4  HR: 76  BP: 151/104  RR: 17  SpO2: 94%    -WBC 13.77 / HgB 11.9 / Hct 39.2 / Plt 213  -Na 137 / Cl 100 / BUN 22 / Glucose 229  -K 5.0 / CO2 29 / Cr 0.80  -ALT -- / Alk Phos -- / T.Bili --  -INR1.06    Imaging: CT A/P 11/06/19    Plan:   -89y Female presents for B/L UAE  -Risks/Benefits/alternatives explained with the patient and/or healthcare proxy and witnessed informed consent obtained.

## 2019-11-06 NOTE — PROGRESS NOTE ADULT - SUBJECTIVE AND OBJECTIVE BOX
R4 GYN ONC Progress Note    HD#2    Patient seen and examined at bedside in ED.  Pad was recently changed by nurse who noted only small amount of blood.  Patient complains of some dizziness while being changed by RN.  Otherwise she denies SOB, CP, palpitations, nausea, and vomiting.  She is NPO and has not been out of bed.      Vital Signs Last 24 Hours  T(C): 36.4 (11-06-19 @ 01:43), Max: 36.6 (11-05-19 @ 20:50)  HR: 66 (11-06-19 @ 05:43) (65 - 74)  BP: 175/81 (11-06-19 @ 05:43) (169/79 - 234/95)  RR: 17 (11-06-19 @ 05:43) (17 - 76)  SpO2: 98% (11-06-19 @ 05:43) (97% - 100%)    I&O's Summary    05 Nov 2019 07:01  -  06 Nov 2019 06:55  --------------------------------------------------------  IN: 0 mL / OUT: 900 mL / NET: -900 mL        Physical Exam:  General: NAD  CV: NR, RR, S1, S2, no M/R/G  Lungs: CTA-B  Abdomen: Soft, obese, nontender, non-distended, +BS  Vaginal packing pink stained, only small amount on pad  Ext: No pain or swelling    Labs:                        11.7   10.83 )-----------( 211      ( 06 Nov 2019 05:51 )             36.5   baso 0.3    eos 0.4    imm gran 0.5    lymph 14.6   mono 8.1    poly 76.1                         11.0   9.77  )-----------( 203      ( 06 Nov 2019 00:56 )             35.6   baso 0.3    eos 0.5    imm gran 0.6    lymph 15.7   mono 7.0    poly 75.9                         13.4   8.47  )-----------( 232      ( 05 Nov 2019 20:45 )             42.1   baso 0.2    eos 0.4    imm gran 0.5    lymph 19.1   mono 6.4    poly 73.4     11-05    137  |  99  |  23  ----------------------------<  333<H>  4.7   |  25  |  0.92    Ca    9.1      05 Nov 2019 20:45  Phos  3.2     11-05  Mg     2.0     11-05    TPro  6.8  /  Alb  3.6  /  TBili  0.4  /  DBili  x   /  AST  11  /  ALT  7   /  AlkPhos  78  11-05    PT/INR - ( 05 Nov 2019 20:45 )   PT: 11.8 SEC;   INR: 1.06          PTT - ( 05 Nov 2019 20:45 )  PTT:34.2 SEC      Blood Type: O Positive      MEDICATIONS  (STANDING):  dextrose 5%. 1000 milliLiter(s) (50 mL/Hr) IV Continuous <Continuous>  dextrose 50% Injectable 12.5 Gram(s) IV Push once  dextrose 50% Injectable 25 Gram(s) IV Push once  dextrose 50% Injectable 25 Gram(s) IV Push once  insulin lispro (HumaLOG) corrective regimen sliding scale   SubCutaneous three times a day before meals  insulin lispro (HumaLOG) corrective regimen sliding scale   SubCutaneous at bedtime  lactated ringers. 1000 milliLiter(s) (75 mL/Hr) IV Continuous <Continuous>  metoprolol tartrate Injectable 5 milliGRAM(s) IV Push every 6 hours    MEDICATIONS  (PRN):  acetaminophen   Tablet .. 975 milliGRAM(s) Oral every 6 hours PRN Mild Pain (1 - 3)  dextrose 40% Gel 15 Gram(s) Oral once PRN Blood Glucose LESS THAN 70 milliGRAM(s)/deciliter  glucagon  Injectable 1 milliGRAM(s) IntraMuscular once PRN Glucose LESS THAN 70 milligrams/deciliter

## 2019-11-06 NOTE — CHART NOTE - NSCHARTNOTEFT_GEN_A_CORE
PRE-INTERVENTIONAL RADIOLOGY PROCEDURE NOTE      Patient Age:89    Patient Gender: Female    Procedure: IR Uterine artery embolization..    Diagnosis/Indication: Vaginal bleeding.    Interventional Radiology Attending Physician: Dr Smith    Ordering Attending Physician: Dr Bar    Pertinent Medical History: Vaginal bleeding after Bx.    Pertinent labs:                      11.7   10.83 )-----------( 211      ( 06 Nov 2019 05:51 )             36.5       11-06    137  |  100  |  22  ----------------------------<  229<H>  5.0   |  29  |  0.80    Ca    9.2      06 Nov 2019 05:52  Phos  3.0     11-06  Mg     2.0     11-06    TPro  6.8  /  Alb  3.6  /  TBili  0.4  /  DBili  x   /  AST  11  /  ALT  7   /  AlkPhos  78  11-05      PT/INR - ( 05 Nov 2019 20:45 )   PT: 11.8 SEC;   INR: 1.06          PTT - ( 05 Nov 2019 20:45 )  PTT:34.2 SEC        Patient and Family Aware ? Yes    SANDRA Burroughs  Contact # [phone]

## 2019-11-06 NOTE — PROGRESS NOTE ADULT - SUBJECTIVE AND OBJECTIVE BOX
PA GynOn Post Op Note    Pt seen and examined at bedside. Pt still in PACU. Pt very agitated and slightly combative. Pt has been on Precedex in PACU. Pt is having some difficulty following commands and continues try and get out of bed.  Pt has vaginal packing and Soler Catheter in place.      T(F): 99.3 (11-06-19 @ 22:25), Max: 99.3 (11-06-19 @ 22:25)  HR: 89 (11-06-19 @ 23:30) (66 - 99)  BP: 126/29 (11-06-19 @ 23:30) (107/48 - 234/95)  RR: 16 (11-06-19 @ 23:30) (11 - 76)  SpO2: 94% (11-06-19 @ 23:30) (90% - 100%)  Wt(kg): --  I&O's Detail    05 Nov 2019 07:01  -  06 Nov 2019 07:00  --------------------------------------------------------  IN:  Total IN: 0 mL    OUT:    Indwelling Catheter - Urethral: 900 mL  Total OUT: 900 mL    Total NET: -900 mL      06 Nov 2019 07:01  -  06 Nov 2019 23:59  --------------------------------------------------------  IN:  Total IN: 0 mL    OUT:    Indwelling Catheter - Urethral: 730 mL  Total OUT: 730 mL    Total NET: -730 mL          Physical Exam:  Constitutional: WDWN female, NAD AxOx3  Skin: warm and dry, left foot noted with heel and great toe wound, right foot noted with great toe, heel, and bilateral ankle wounds. All wounds have dressing over ulcers. Pt has severe right kyphosis noted.   Chest: s1s2+, RRR, clear to auscultation bilaterally, no w/r/r    Abdomen: soft, nondistended, no guarding, no rebound, hypoactive bowel sounds,  : Soler in place, vaginal packing in place and noted to be saturated. Vaginal packing is original from yesterday. Some dried and fresh blood noted on labia b/l.  4x4s placed over packing to measure active bleeding. Right groin wound noted with dressing in place.  As per IR team, Wound care is recommended to evaluate right groin.     Extremities: no lower extremity edema or calf tenderness bilaterally; intermittent compression stockings in place     LABS:                        11.9   13.77 )-----------( 213      ( 06 Nov 2019 14:52 )             39.2     11-06    137  |  100  |  22  ----------------------------<  229<H>  5.0   |  29  |  0.80    Ca    9.2      06 Nov 2019 05:52  Phos  3.0     11-06  Mg     2.0     11-06          a/p: This 89y female, s/p Uterine Artery Embolization (See IR Note) for excessive vaginal bleeding 2/2 cervical mass.  Pt with episodes of agitation and combativeness, on Precedex.     CV: hemodynamically stable  PUL: adequate on RA  GI: NPO at present  : Soler in place, vaginal packing in place and noted to be saturated. Vaginal packing is original from yesterday. Some dried and fresh blood noted on labia b/l.  4x4s placed over packing to measure active bleeding. Right groin wound noted with dressing in place.  As per IR team, Wound care is recommended to evaluate right groin.     ID: afebrile, WBC stable, labs pending in am  DVT prophylaxis: SQ Heparin,   Pain Management: PCA ordered for post op management, however at this time, pt is unable to follow directions for PCA  continue IV Fluids  continue with finger sticks as ordered   Wound care team to eval pt in am  d/w gyn onc overnight team  Constant Observation 1:1 ordered for overnight for pt safety    MAYDA Rivera  #[id number] PA GynOn Post Op Note    Pt seen and examined at bedside. Pt still in PACU. Pt very agitated and slightly combative. Pt has been on Precedex in PACU. Pt is having some difficulty following commands and continues try and get out of bed.  Pt has vaginal packing and Soler Catheter in place.      T(F): 99.3 (11-06-19 @ 22:25), Max: 99.3 (11-06-19 @ 22:25)  HR: 89 (11-06-19 @ 23:30) (66 - 99)  BP: 126/29 (11-06-19 @ 23:30) (107/48 - 234/95)  RR: 16 (11-06-19 @ 23:30) (11 - 76)  SpO2: 94% (11-06-19 @ 23:30) (90% - 100%)  Wt(kg): --  I&O's Detail    05 Nov 2019 07:01  -  06 Nov 2019 07:00  --------------------------------------------------------  IN:  Total IN: 0 mL    OUT:    Indwelling Catheter - Urethral: 900 mL  Total OUT: 900 mL    Total NET: -900 mL      06 Nov 2019 07:01  -  06 Nov 2019 23:59  --------------------------------------------------------  IN:  Total IN: 0 mL    OUT:    Indwelling Catheter - Urethral: 730 mL  Total OUT: 730 mL    Total NET: -730 mL          Physical Exam:  Constitutional: WDWN female, NAD AxOx3  Skin: warm and dry, left foot noted with heel and great toe wound, right foot noted with great toe, heel, and bilateral ankle wounds. All wounds have dressing over ulcers. Pt has severe right kyphosis noted.   Chest: s1s2+, RRR, clear to auscultation bilaterally, no w/r/r    Abdomen: soft, nondistended, no guarding, no rebound, hypoactive bowel sounds,  : Soler in place, vaginal packing in place and noted to be saturated. Vaginal packing is original from yesterday. Some dried and fresh blood noted on labia b/l.  4x4s placed over packing to measure active bleeding. Right groin wound noted with dressing in place.  As per IR team, Wound care is recommended to evaluate right groin.     Extremities: no lower extremity edema or calf tenderness bilaterally; intermittent compression stockings in place     LABS:                        11.9   13.77 )-----------( 213      ( 06 Nov 2019 14:52 )             39.2     11-06    137  |  100  |  22  ----------------------------<  229<H>  5.0   |  29  |  0.80    Ca    9.2      06 Nov 2019 05:52  Phos  3.0     11-06  Mg     2.0     11-06          a/p: This 89y female, s/p Uterine Artery Embolization (See IR Note) for excessive vaginal bleeding 2/2 cervical mass.  Pt with episodes of agitation and combativeness, on Precedex.     CV: hemodynamically stable  PUL: adequate on RA  GI: NPO at present  : Soler in place, vaginal packing in place and noted to be saturated. Vaginal packing is original from yesterday. Some dried and fresh blood noted on labia b/l.  4x4s placed over packing to measure active bleeding. Right groin wound noted with dressing in place.  As per IR team, Wound care is recommended to evaluate right groin.     ID: afebrile, WBC stable, labs pending in am  DVT prophylaxis: SQ Heparin,   Pain Management: PCA ordered for post op management, however at this time, pt is unable to follow directions for PCA  continue IV Fluids  continue with finger sticks as ordered   Wound care team to eval pt in am  d/w gyn onc overnight team  Constant Observation 1:1 ordered for overnight for pt safety.  Will reevaluate this need as pt becomes more alert.      MAYDA Rivera  #[id number] PA GynOn Post Op Note    Pt seen and examined at bedside. Pt still in PACU. Pt very agitated and slightly combative. Pt has been on Precedex in PACU. Pt is having some difficulty following commands and continues try and get out of bed.  Pt has vaginal packing and Soler Catheter in place.      T(F): 99.3 (11-06-19 @ 22:25), Max: 99.3 (11-06-19 @ 22:25)  HR: 89 (11-06-19 @ 23:30) (66 - 99)  BP: 126/29 (11-06-19 @ 23:30) (107/48 - 234/95)  RR: 16 (11-06-19 @ 23:30) (11 - 76)  SpO2: 94% (11-06-19 @ 23:30) (90% - 100%)  Wt(kg): --  I&O's Detail    05 Nov 2019 07:01  -  06 Nov 2019 07:00  --------------------------------------------------------  IN:  Total IN: 0 mL    OUT:    Indwelling Catheter - Urethral: 900 mL  Total OUT: 900 mL    Total NET: -900 mL      06 Nov 2019 07:01  -  06 Nov 2019 23:59  --------------------------------------------------------  IN:  Total IN: 0 mL    OUT:    Indwelling Catheter - Urethral: 730 mL  Total OUT: 730 mL    Total NET: -730 mL          Physical Exam:  Constitutional: WDWN female, NAD AxOx3  Skin: warm and dry, left foot noted with heel and great toe wound, right foot noted with great toe, heel, and bilateral ankle wounds. All wounds have dressing over ulcers. Pt has severe right kyphosis noted.   Chest: s1s2+, RRR, clear to auscultation bilaterally, no w/r/r    Abdomen: soft, nondistended, no guarding, no rebound, hypoactive bowel sounds,  : Soler in place, vaginal packing in place and noted to be saturated. Vaginal packing is original from yesterday. Some dried and fresh blood noted on labia b/l.  4x4s placed over packing to measure active bleeding. Right groin wound noted with dressing in place.  As per IR team, Wound care is recommended to evaluate right groin.     Extremities: no lower extremity edema or calf tenderness bilaterally; intermittent compression stockings in place     LABS:                        11.9   13.77 )-----------( 213      ( 06 Nov 2019 14:52 )             39.2     11-06    137  |  100  |  22  ----------------------------<  229<H>  5.0   |  29  |  0.80    Ca    9.2      06 Nov 2019 05:52  Phos  3.0     11-06  Mg     2.0     11-06          a/p: This 89y female, s/p Uterine Artery Embolization (See IR Note) for excessive vaginal bleeding 2/2 cervical mass.  Pt with episodes of agitation and combativeness, on Precedex.     CV: hemodynamically stable  PUL: adequate on RA  GI: NPO at present  : Soler in place, vaginal packing in place and noted to be saturated. Vaginal packing is original from yesterday. Some dried and fresh blood noted on labia b/l.  4x4s placed over packing to measure active bleeding. Right groin wound noted with dressing in place.  As per IR team, Wound care is recommended to evaluate right groin.     ID: afebrile, WBC stable, labs pending in am  DVT prophylaxis: venodynes b/l  Pain Management: PCA ordered for post op management, however at this time, pt is unable to follow directions for PCA  continue IV Fluids  continue with finger sticks as ordered   Wound care team to eval pt in am  d/w gyn onc overnight team  Constant Observation 1:1 ordered for overnight for pt safety.  Will reevaluate this need as pt becomes more alert.      Miguel PAC  #[id number]

## 2019-11-07 ENCOUNTER — TRANSCRIPTION ENCOUNTER (OUTPATIENT)
Age: 84
End: 2019-11-07

## 2019-11-07 DIAGNOSIS — N93.9 ABNORMAL UTERINE AND VAGINAL BLEEDING, UNSPECIFIED: ICD-10-CM

## 2019-11-07 LAB
ANION GAP SERPL CALC-SCNC: 11 MMO/L — SIGNIFICANT CHANGE UP (ref 7–14)
BLD GP AB SCN SERPL QL: NEGATIVE — SIGNIFICANT CHANGE UP
BUN SERPL-MCNC: 15 MG/DL — SIGNIFICANT CHANGE UP (ref 7–23)
CALCIUM SERPL-MCNC: 8.4 MG/DL — SIGNIFICANT CHANGE UP (ref 8.4–10.5)
CHLORIDE SERPL-SCNC: 99 MMOL/L — SIGNIFICANT CHANGE UP (ref 98–107)
CO2 SERPL-SCNC: 26 MMOL/L — SIGNIFICANT CHANGE UP (ref 22–31)
CREAT SERPL-MCNC: 0.83 MG/DL — SIGNIFICANT CHANGE UP (ref 0.5–1.3)
CRP SERPL-MCNC: 157.8 MG/L — HIGH
ERYTHROCYTE [SEDIMENTATION RATE] IN BLOOD: 59 MM/HR — HIGH (ref 4–25)
GLUCOSE SERPL-MCNC: 183 MG/DL — HIGH (ref 70–99)
HCT VFR BLD CALC: 32.5 % — LOW (ref 34.5–45)
HGB BLD-MCNC: 10.1 G/DL — LOW (ref 11.5–15.5)
MAGNESIUM SERPL-MCNC: 1.7 MG/DL — SIGNIFICANT CHANGE UP (ref 1.6–2.6)
MCHC RBC-ENTMCNC: 28.5 PG — SIGNIFICANT CHANGE UP (ref 27–34)
MCHC RBC-ENTMCNC: 31.1 % — LOW (ref 32–36)
MCV RBC AUTO: 91.5 FL — SIGNIFICANT CHANGE UP (ref 80–100)
NRBC # FLD: 0 K/UL — SIGNIFICANT CHANGE UP (ref 0–0)
PHOSPHATE SERPL-MCNC: 3.5 MG/DL — SIGNIFICANT CHANGE UP (ref 2.5–4.5)
PLATELET # BLD AUTO: 164 K/UL — SIGNIFICANT CHANGE UP (ref 150–400)
PMV BLD: 10.6 FL — SIGNIFICANT CHANGE UP (ref 7–13)
POTASSIUM SERPL-MCNC: 3.9 MMOL/L — SIGNIFICANT CHANGE UP (ref 3.5–5.3)
POTASSIUM SERPL-SCNC: 3.9 MMOL/L — SIGNIFICANT CHANGE UP (ref 3.5–5.3)
RBC # BLD: 3.55 M/UL — LOW (ref 3.8–5.2)
RBC # FLD: 15.3 % — HIGH (ref 10.3–14.5)
RH IG SCN BLD-IMP: POSITIVE — SIGNIFICANT CHANGE UP
SODIUM SERPL-SCNC: 136 MMOL/L — SIGNIFICANT CHANGE UP (ref 135–145)
WBC # BLD: 16.41 K/UL — HIGH (ref 3.8–10.5)
WBC # FLD AUTO: 16.41 K/UL — HIGH (ref 3.8–10.5)

## 2019-11-07 PROCEDURE — 73630 X-RAY EXAM OF FOOT: CPT | Mod: 26,50

## 2019-11-07 PROCEDURE — 99232 SBSQ HOSP IP/OBS MODERATE 35: CPT

## 2019-11-07 RX ORDER — LISINOPRIL 2.5 MG/1
5 TABLET ORAL DAILY
Refills: 0 | Status: DISCONTINUED | OUTPATIENT
Start: 2019-11-07 | End: 2019-11-08

## 2019-11-07 RX ORDER — MAGNESIUM OXIDE 400 MG ORAL TABLET 241.3 MG
400 TABLET ORAL ONCE
Refills: 0 | Status: COMPLETED | OUTPATIENT
Start: 2019-11-07 | End: 2019-11-07

## 2019-11-07 RX ORDER — OXYCODONE HYDROCHLORIDE 5 MG/1
2.5 TABLET ORAL EVERY 6 HOURS
Refills: 0 | Status: DISCONTINUED | OUTPATIENT
Start: 2019-11-07 | End: 2019-11-08

## 2019-11-07 RX ORDER — HEPARIN SODIUM 5000 [USP'U]/ML
5000 INJECTION INTRAVENOUS; SUBCUTANEOUS EVERY 8 HOURS
Refills: 0 | Status: DISCONTINUED | OUTPATIENT
Start: 2019-11-07 | End: 2019-11-08

## 2019-11-07 RX ORDER — LISINOPRIL 2.5 MG/1
5 TABLET ORAL DAILY
Refills: 0 | Status: DISCONTINUED | OUTPATIENT
Start: 2019-11-07 | End: 2019-11-07

## 2019-11-07 RX ORDER — ACETAMINOPHEN 500 MG
3 TABLET ORAL
Qty: 0 | Refills: 0 | DISCHARGE
Start: 2019-11-07

## 2019-11-07 RX ORDER — METOPROLOL TARTRATE 50 MG
50 TABLET ORAL DAILY
Refills: 0 | Status: DISCONTINUED | OUTPATIENT
Start: 2019-11-07 | End: 2019-11-07

## 2019-11-07 RX ORDER — OXYCODONE HYDROCHLORIDE 5 MG/1
5 TABLET ORAL EVERY 6 HOURS
Refills: 0 | Status: DISCONTINUED | OUTPATIENT
Start: 2019-11-07 | End: 2019-11-08

## 2019-11-07 RX ORDER — METOPROLOL TARTRATE 50 MG
50 TABLET ORAL DAILY
Refills: 0 | Status: DISCONTINUED | OUTPATIENT
Start: 2019-11-07 | End: 2019-11-08

## 2019-11-07 RX ADMIN — INSULIN GLARGINE 5 UNIT(S): 100 INJECTION, SOLUTION SUBCUTANEOUS at 21:51

## 2019-11-07 RX ADMIN — Medication 975 MILLIGRAM(S): at 14:20

## 2019-11-07 RX ADMIN — OXYCODONE HYDROCHLORIDE 2.5 MILLIGRAM(S): 5 TABLET ORAL at 21:54

## 2019-11-07 RX ADMIN — Medication 975 MILLIGRAM(S): at 05:05

## 2019-11-07 RX ADMIN — HEPARIN SODIUM 5000 UNIT(S): 5000 INJECTION INTRAVENOUS; SUBCUTANEOUS at 13:24

## 2019-11-07 RX ADMIN — HEPARIN SODIUM 5000 UNIT(S): 5000 INJECTION INTRAVENOUS; SUBCUTANEOUS at 21:51

## 2019-11-07 RX ADMIN — SODIUM CHLORIDE 75 MILLILITER(S): 9 INJECTION, SOLUTION INTRAVENOUS at 00:00

## 2019-11-07 RX ADMIN — OXYCODONE HYDROCHLORIDE 2.5 MILLIGRAM(S): 5 TABLET ORAL at 22:24

## 2019-11-07 RX ADMIN — Medication 975 MILLIGRAM(S): at 13:22

## 2019-11-07 RX ADMIN — Medication 2: at 17:54

## 2019-11-07 RX ADMIN — MAGNESIUM OXIDE 400 MG ORAL TABLET 400 MILLIGRAM(S): 241.3 TABLET ORAL at 08:31

## 2019-11-07 RX ADMIN — Medication 1 APPLICATION(S): at 14:40

## 2019-11-07 RX ADMIN — Medication 2: at 12:43

## 2019-11-07 RX ADMIN — Medication 1: at 08:30

## 2019-11-07 RX ADMIN — INSULIN GLARGINE 5 UNIT(S): 100 INJECTION, SOLUTION SUBCUTANEOUS at 02:04

## 2019-11-07 RX ADMIN — Medication 975 MILLIGRAM(S): at 05:35

## 2019-11-07 RX ADMIN — Medication 5 MILLIGRAM(S): at 05:05

## 2019-11-07 NOTE — END OF VISIT
[] : Resident [[email or web address]] : patient seen and examined at bedside. with resident. agree with above assessment and plan

## 2019-11-07 NOTE — CONSULT NOTE ADULT - SUBJECTIVE AND OBJECTIVE BOX
Patient is a 89y old  Female who presents with a chief complaint of c/s for B/L UAE (07 Nov 2019 08:03)      HPI:  89 y/o G0 LMP 1979 presenting from Gyn clinic for significant vaginal bleeding. Pt originally presented to gyn clinic with postmenopausal bleeding that she began to note over the past year. She had 6 episodes during this time period, most recently 10/31-11/3. Bleeding is initially heavy with cramping and then decreases to spotting. She had a CT scan 18 years ago that showed a mass in her uterus that she was supposed to undergo a hysterectomy for but the patient reports that the case was aborted when she was on the operating table because "her numbers were wacky." Patient reports she had a TAUS earlier this year at her nursing home that was reportedly normal per the patient. In gyn clinic today on exam a "4-5 cm amorphous mass was noted to be emanating from the posterior vagina, possibly from the cervix" A portion mass was removed and sent for tissue biopsy which resulted in brisk bleeding from the mass. Pressure was applied and bleeding subsided, vitals were stable, but when pt when to go use the restroom she had been noted to soak through 1-2 pads. Pt was still asymptomatic and vitals stable but due to continued bleeding she was advised to come to the emergency room. Here in the ED she reports some dizziness, but otherwise denies symptoms including CP, SOB, N/V, palpitations.  POBhx: G0  PGynhx: "mass in uterus", only had a few pap smears in her lifetime  PMH: Breast cancer 1968, 1978 s/p R radical mastectomy and L modified mastectomy, uncontrolled T2DM c/b neuropathy and recurrent non-healing ulcers of LE, anxiety, HTN, PVD, GERD, overactive bladder, OA, cardiac stent  PSH: none  FH: denies family history of breast, ovarian, uterine, cervical, colon cancer  Meds: does not know the names  All: none  Social: Lives in a nursing home, used to smoke 3 packs/day for 50 years (05 Nov 2019 20:47)      PAST MEDICAL & SURGICAL HISTORY:  HTN (hypertension)  CAD (coronary artery disease)  Breast Cancer: at 50 yo and 40 yo, bilateral mastectomy  CVA (Cerebral Infarction): 1997 - no residual symptoms  History of Obesity  Diabetes Mellitus  Benign Hypertension  Fracture of shaft of tibia and fibula: right  S/P PTCA (percutaneous transluminal coronary angioplasty)  Ankle Injury: left ankle surgery  History of Mastectomy: 1969 and 1979      MEDICATIONS  (STANDING):  dextrose 5%. 1000 milliLiter(s) (50 mL/Hr) IV Continuous <Continuous>  dextrose 5%. 1000 milliLiter(s) (50 mL/Hr) IV Continuous <Continuous>  dextrose 50% Injectable 12.5 Gram(s) IV Push once  dextrose 50% Injectable 25 Gram(s) IV Push once  dextrose 50% Injectable 25 Gram(s) IV Push once  dextrose 50% Injectable 12.5 Gram(s) IV Push once  dextrose 50% Injectable 25 Gram(s) IV Push once  dextrose 50% Injectable 25 Gram(s) IV Push once  heparin  Injectable 5000 Unit(s) SubCutaneous every 8 hours  insulin glargine Injectable (LANTUS) 5 Unit(s) SubCutaneous at bedtime  insulin lispro (HumaLOG) corrective regimen sliding scale   SubCutaneous three times a day before meals  insulin lispro (HumaLOG) corrective regimen sliding scale   SubCutaneous at bedtime  lisinopril 5 milliGRAM(s) Oral daily  metoprolol succinate ER 50 milliGRAM(s) Oral daily  silver sulfADIAZINE 1% Cream 1 Application(s) Topical daily    MEDICATIONS  (PRN):  acetaminophen   Tablet .. 975 milliGRAM(s) Oral every 6 hours PRN Mild Pain (1 - 3)  dextrose 40% Gel 15 Gram(s) Oral once PRN Blood Glucose LESS THAN 70 milliGRAM(s)/deciliter  dextrose 40% Gel 15 Gram(s) Oral once PRN Blood Glucose LESS THAN 70 milliGRAM(s)/deciliter  glucagon  Injectable 1 milliGRAM(s) IntraMuscular once PRN Glucose LESS THAN 70 milligrams/deciliter  glucagon  Injectable 1 milliGRAM(s) IntraMuscular once PRN Glucose LESS THAN 70 milligrams/deciliter  ondansetron Injectable 4 milliGRAM(s) IV Push once PRN Nausea and/or Vomiting  oxyCODONE    IR 2.5 milliGRAM(s) Oral every 6 hours PRN Moderate Pain (4 - 6)  oxyCODONE    IR 5 milliGRAM(s) Oral every 6 hours PRN Severe Pain (7 - 10)      Allergies    No Known Allergies    Intolerances        VITALS:    Vital Signs Last 24 Hrs  T(C): 36.9 (07 Nov 2019 11:47), Max: 37.4 (06 Nov 2019 22:25)  T(F): 98.5 (07 Nov 2019 11:47), Max: 99.3 (06 Nov 2019 22:25)  HR: 83 (07 Nov 2019 11:47) (68 - 99)  BP: 147/52 (07 Nov 2019 11:47) (105/35 - 184/79)  BP(mean): 65 (07 Nov 2019 02:30) (51 - 135)  RR: 16 (07 Nov 2019 11:47) (11 - 22)  SpO2: 100% (07 Nov 2019 11:47) (90% - 100%)    LABS:                          10.1   16.41 )-----------( 164      ( 07 Nov 2019 06:00 )             32.5       11-07    136  |  99  |  15  ----------------------------<  183<H>  3.9   |  26  |  0.83    Ca    8.4      07 Nov 2019 06:00  Phos  3.5     11-07  Mg     1.7     11-07    TPro  6.8  /  Alb  3.6  /  TBili  0.4  /  DBili  x   /  AST  11  /  ALT  7   /  AlkPhos  78  11-05      CAPILLARY BLOOD GLUCOSE  169 (07 Nov 2019 08:30)      POCT Blood Glucose.: 229 mg/dL (07 Nov 2019 12:06)  POCT Blood Glucose.: 169 mg/dL (07 Nov 2019 08:16)  POCT Blood Glucose.: 163 mg/dL (07 Nov 2019 01:59)  POCT Blood Glucose.: 196 mg/dL (06 Nov 2019 13:25)      PT/INR - ( 05 Nov 2019 20:45 )   PT: 11.8 SEC;   INR: 1.06          PTT - ( 05 Nov 2019 20:45 )  PTT:34.2 SEC    LOWER EXTREMITY PHYSICAL EXAM:    Vascular: DP/PT 0/4, B/L, CFT <3 seconds B/L, Temperature gradient warm to cool B/L.   Neuro: Epicritic sensation absent to the level of akles B/L.  Musculoskeletal/Ortho: pes planus with abducted foot type & contracted foot type  Skin:     Wound 1: Right foot medial ankle wound, measuring 4.0x3.5cm, depth to subq, wound bed fibrogranular, serous drainage, no malodor, periwound normal, etiology 2/2 vascular insufficiency    Wound 2: R medial midfoot wound, measuring 1.0x1.0cm, depth to subq, wound bed fibrous, no malodor, no probe to bone, fibrous drainage, etiology 2/2 vascular insufficiency and pes planus deformity    Wound 3: Left foot plantar medial midfoot wound, 1.5x1.5cm, depth to subq, fibrotic wound bed, no malodor, no probe to bone, serous drainage, etiology 2/2 vascular insufficiency and pes planus deformity    Wound 4: distal plantar hallucal wound, 0.5x0.5cm, depth to subq, no active drainage, fibrogranular wound bed, no malodor, serous drainage, etiology 2/2 hammered hallux deformity and vascular deformity    RADIOLOGY & ADDITIONAL STUDIES:

## 2019-11-07 NOTE — CHART NOTE - NSCHARTNOTEFT_GEN_A_CORE
89F w/ significant PMHx w/ podiatric c/c of b/l foot wounds  - B/L pedal x-rays reviewed alongside attending and given clinical presentation of Left foot medial hallucal IPJ wound probing to bone,  elevated white count and mild radiolucency on distal medial proximal phalanx head with osteomyelitis unable to be ruled out  - Ordered ESR, CRP, and RAMIRO/PVR to evaluate inflammatory markers and vascular status.   - d/w attending

## 2019-11-07 NOTE — PROGRESS NOTE ADULT - SUBJECTIVE AND OBJECTIVE BOX
Interventional Radiology Follow- Up Note      89y Female s/p___________ on _________ in Interventional Radiology with Dr. Ruffin. Patient seen and examined @ bedside.   Site c/d/i with _______cc output.   Cx results ___________.   No complaints offered.    Vitals: T(F): 98 (11-07-19 @ 05:00), Max: 99.3 (11-06-19 @ 22:25)  HR: 90 (11-07-19 @ 05:00) (68 - 99)  BP: 150/97 (11-07-19 @ 05:00) (105/35 - 203/78)  RR: 15 (11-07-19 @ 05:00) (11 - 22)  SpO2: 98% (11-07-19 @ 05:00) (90% - 100%)  Wt(kg): --    LABS:                        10.1   16.41 )-----------( 164      ( 07 Nov 2019 06:00 )             32.5     11-07    136  |  99  |  15  ----------------------------<  183<H>  3.9   |  26  |  0.83    Ca    8.4      07 Nov 2019 06:00  Phos  3.5     11-07  Mg     1.7     11-07    TPro  6.8  /  Alb  3.6  /  TBili  0.4  /  DBili  x   /  AST  11  /  ALT  7   /  AlkPhos  78  11-05    PT/INR - ( 05 Nov 2019 20:45 )   PT: 11.8 SEC;   INR: 1.06          PTT - ( 05 Nov 2019 20:45 )  PTT:34.2 SEC  I&O's Detail    06 Nov 2019 07:01  -  07 Nov 2019 07:00  --------------------------------------------------------  IN:    lactated ringers.: 600 mL  Total IN: 600 mL    OUT:    Indwelling Catheter - Urethral: 1705 mL  Total OUT: 1705 mL    Total NET: -1105 mL            PHYSICAL EXAM:    General: Nontoxic, in NAD  Neuro:  Alert & oriented x 3  Abdomen: soft, NTND. Normactive B  Extremities: no pedal edema or calf tenderness noted     Impression: 89y Female s/p ________    Plan:  -continue to monitor  -Flush drain per doctor orders  -trend vitals, labs     Please call IR at extension [phone] with any questions, concerns, or issues regarding above. Interventional Radiology Follow- Up Note    No complaints offered. Patient is concerned about bleeding as packing has now been removed.     Vitals: T(F): 98 (11-07-19 @ 05:00), Max: 99.3 (11-06-19 @ 22:25)  HR: 90 (11-07-19 @ 05:00) (68 - 99)  BP: 150/97 (11-07-19 @ 05:00) (105/35 - 203/78)  RR: 15 (11-07-19 @ 05:00) (11 - 22)  SpO2: 98% (11-07-19 @ 05:00) (90% - 100%)  Wt(kg): --    LABS:                        10.1   16.41 )-----------( 164      ( 07 Nov 2019 06:00 )             32.5     11-07    136  |  99  |  15  ----------------------------<  183<H>  3.9   |  26  |  0.83    Ca    8.4      07 Nov 2019 06:00  Phos  3.5     11-07  Mg     1.7     11-07    TPro  6.8  /  Alb  3.6  /  TBili  0.4  /  DBili  x   /  AST  11  /  ALT  7   /  AlkPhos  78  11-05    PT/INR - ( 05 Nov 2019 20:45 )   PT: 11.8 SEC;   INR: 1.06          PTT - ( 05 Nov 2019 20:45 )  PTT:34.2 SEC  I&O's Detail    06 Nov 2019 07:01  -  07 Nov 2019 07:00  --------------------------------------------------------  IN:    lactated ringers.: 600 mL  Total IN: 600 mL    OUT:    Indwelling Catheter - Urethral: 1705 mL  Total OUT: 1705 mL    Total NET: -1105 mL      PHYSICAL EXAM:    General: Nontoxic, in NAD  Extremities: R groin dressing slightly saturated with serosanguinous output from R groin wound. R groin soft, non-tender. No hematoma. R Fem 1+. No palpable DP.     Impression: 89y Female s/p left uterine artery and right hypogastric artery embolization on 11/07/19.    Plan:  -No further intervention from IR standpoint.   -Recommend wound care consultation prior to D/C for R Groin wound.      Hai Calderon MD  PGY-6, Interventional Radiology  Pager: [phone]

## 2019-11-07 NOTE — DISCHARGE NOTE NURSING/CASE MANAGEMENT/SOCIAL WORK - NSDCPNDISPN_GEN_ALL_CORE
Activities of daily living, including home environment that might     exacerbate pain or reduce effectiveness of the pain management plan of care as well as strategies to address these issues/Side effects of pain management treatment/Safe use, storage and disposal of opioids when prescribed Side effects of pain management treatment/Education provided on the pain management plan of care/Activities of daily living, including home environment that might     exacerbate pain or reduce effectiveness of the pain management plan of care as well as strategies to address these issues/Safe use, storage and disposal of opioids when prescribed Side effects of pain management treatment/Education provided on the pain management plan of care/Activities of daily living, including home environment that might     exacerbate pain or reduce effectiveness of the pain management plan of care as well as strategies to address these issues/Opioids not applicable/not prescribed

## 2019-11-07 NOTE — CONSULT NOTE ADULT - ASSESSMENT
89F w/ significant PMHx w/ podiatric c/c of b/l foot wounds  - Pt seen and evaluated in ED   - Vitals stable, WBC 16.41, ESR  - Right foot medial ankle wound, measuring 4.0x3.5cm, depth to subq, wound bed fibrogranular, serous drainage, no malodor, periwound normal, etiology 2/2 vascular insufficiency; R medial midfoot wound, measuring 1.0x1.0cm, depth to subq, wound bed fibrous, no malodor, no probe to bone, fibrous drainage, etiology 2/2 vascular insufficiency and pes planus deformity;  - Left foot plantar medial midfoot wound, 1.5x1.5cm, depth to subq, fibrotic wound bed, no malodor, no probe to bone, serous drainage, etiology 2/2 vascular insufficiency and pes planus deformity; Distal plantar hallucal wound, 0.5x0.5cm, depth to subq, no active drainage, fibrogranular wound bed, no malodor, serous drainage, etiology 2/2 hammered hallux deformity and vascular deformity  - Wounds demonstrate no acute signs of infection, no purulent drainage, no concern for infection of pedal wounds  - Ordered b/l foot x-rays to rule out osteomyelitis  - Podiatry will continue to monitor  - d/w attending 89F w/ significant PMHx w/ podiatric c/c of b/l foot wounds  - Pt seen and evaluated in ED   - Vitals stable, WBC 16.41, ESR  - Right foot medial ankle wound, measuring 4.0x3.5cm, depth to subq, wound bed fibrogranular, serous drainage, no malodor, periwound normal, etiology 2/2 vascular insufficiency; R medial midfoot wound, measuring 1.0x1.0cm, depth to subq, wound bed fibrous, no malodor, no probe to bone, fibrous drainage, etiology 2/2 vascular insufficiency and pes planus deformity;  - Left foot plantar medial midfoot wound, 1.5x1.5cm, depth to subq, fibrotic wound bed, no malodor, no probe to bone, serous drainage, etiology 2/2 vascular insufficiency and pes planus deformity; Distal plantar hallucal wound, 0.5x0.5cm, depth to bone, no active drainage, fibrogranular wound bed, no malodor, serous drainage, etiology 2/2 hammered hallux deformity and vascular deformity  - Wounds demonstrate no acute signs of infection, no purulent drainage, no concern for infection of pedal wounds  - Ordered b/l foot x-rays to rule out osteomyelitis  - Podiatry will continue to monitor  - d/w attending

## 2019-11-07 NOTE — DISCHARGE NOTE PROVIDER - NSDCFUADDINST_GEN_ALL_CORE_FT
nothing in vagina (sex, tampons, douching, tub baths) no heavy lifting (>10 lbs) for 6 weeks. Please return to ER or call your doctors office if pain is worsening, you are unable to keep down food or drink, fever >100.4, heavy vaginal bleeding (soaking through 1 pad in 1 hr or less). You are able to take a shower regularly.

## 2019-11-07 NOTE — DISCHARGE NOTE PROVIDER - NSDCFUADDAPPT_GEN_ALL_CORE_FT
Podiatry Discharge Instructions:  Follow up: Please follow up with Dr. Potts within 1 week of discharge from the hospital, please call [phone] for appointment and discuss that you recently were seen in the hospital.  Wound Care: Please apply Aquacel-Ag to wound and dress with 4x4 gauze and sung dressing to both feet.   Weight bearing: Please weight bear as tolerated in a surgical shoe.  Antibiotics: Please continue as instructed.

## 2019-11-07 NOTE — DISCHARGE NOTE PROVIDER - PROVIDER TOKENS
FREE:[LAST:[[facility]],PHONE:[phone]],FAX:[(   )    -],ADDRESS:[[facility]   Oncology building, 3rd floor  [address] [place] [address]],FOLLOWUP:[2 weeks]],FREE:[LAST:[[facility]],PHONE:[phone]],FAX:[(   )    -]] FREE:[LAST:[[facility]],PHONE:[phone]],FAX:[(   )    -],ADDRESS:[[facility]   Oncology building, 3rd floor  [address] [place] [address]],SCHEDULEDAPPT:[11/20/2019],SCHEDULEDAPPTTIME:[12:30 PM]],FREE:[LAST:[[facility]],PHONE:[phone]],FAX:[(   )    -]]

## 2019-11-07 NOTE — CHART NOTE - NSCHARTNOTEFT_GEN_A_CORE
Patient's discharge plan and follow-up appointment reviewed with radha Godinez via telephone.  All questions answered.    SARAH Courtney PGY4

## 2019-11-07 NOTE — DISCHARGE NOTE NURSING/CASE MANAGEMENT/SOCIAL WORK - NSDCFUADDAPPT_GEN_ALL_CORE_FT
Podiatry Discharge Instructions:  Follow up: Please follow up with Dr. Potts within 1 week of discharge from the hospital, please call [phone] for appointment and discuss that you recently were seen in the hospital.  Wound Care: Please apply Aquacel-Ag to wound and dress with 4x4 gauze and sung dressing to both feet.   Weight bearing: Please weight bear as tolerated in a surgical shoe.  Antibiotics: Please continue as instructed.

## 2019-11-07 NOTE — CHART NOTE - NSCHARTNOTEFT_GEN_A_CORE
Vaginal packing removed at 7am. No bleeding noted. Soler catheter removed at 10:30am. Patient is DTV by 6:30pm. Gyn/onc team updated.

## 2019-11-07 NOTE — PROGRESS NOTE ADULT - PROBLEM SELECTOR PLAN 1
CV: Hemodynamically stable. H/H 11.9/39.2->10.1/32.5. WBC 13.77->16.41. PMH HTN, lopressor on board. Consistently elevated BP, baseline seems to be ~170s/80-90s. Most recently 150/90. Transition to home meds this AM once tolerating PO.   Pulm: Saturating well on 2L NC. Decreased O2 sat overnight. Now, %.  Increase incentive spirometry.  GI: Advance diet as tolerated this AM. LR@75.   : Soler in place. Adequate UOP. Vaginal packing 1.5 in place. To be removed today and monitor closely. Monitor R groin abrasion.   Endo: PMH T2DM - Lantus 5 and ISS. -210s.   Heme: Continue Venodynes for DVT ppx. Increase OOB.    Neuro: AOx4. D/c 1:1 for patient safety. Continue tylenol for pain.     Del Porter PGY1

## 2019-11-07 NOTE — PATIENT PROFILE ADULT - ARRIVAL FROM
Pt reports staying at [facility] in [place], [place]/[facility]

## 2019-11-07 NOTE — CHART NOTE - NSCHARTNOTEFT_GEN_A_CORE
Pt was seen by attending and was deemed to have adequate soft tissue coverage of left foot distal medial hallucal IPJ wound to bone, there is now low concern for infection and patient may be discharged per primary team.

## 2019-11-07 NOTE — PHYSICAL EXAM
[Vulvar Atrophy] : vulvar atrophy [Normal] : urethra [Labia Majora] : labia major [Atrophy] : atrophy [Cystocele] : a cystocele [Discharge] : a  ~M vaginal discharge was present [Normal Position] : in a normal position [Labia Majora Erythema] : no erythema of the labia majora [Tenderness] : nontender [FreeTextEntry4] : mik red blood noted to be pooling in vaginal vault [FreeTextEntry5] : had a 4-5cm amorphous mass noted to be aborting from the cervix. Mass was removed with the use of ring forceps. Active bleeding noted upon removal of mass.

## 2019-11-07 NOTE — DISCHARGE NOTE PROVIDER - CARE PROVIDER_API CALL
[facility],   [facility]   Oncology building, 3rd floor  [address], [place]  [address]  Phone: [phone]  Fax: (   )    -  Follow Up Time: 2 weeks    [facility],   Phone: [phone]  Fax: (   )    -  Follow Up Time: [facility],   [facility]   Oncology building, 3rd floor  [address], [place]  [address]  Phone: [phone]  Fax: (   )    -  Scheduled Appointment: 11/20/2019 12:30 PM    [facility],   Phone: [phone]  Fax: (   )    -  Follow Up Time:

## 2019-11-07 NOTE — DISCHARGE NOTE NURSING/CASE MANAGEMENT/SOCIAL WORK - NSDCPNINST_GEN_ALL_CORE
pt in wheelchair when emt arrived, eating, incontinent void. iv discontinued. no distress noted. patient has right groin dressing intact and dry, changed d/t being soiled this am. bilateral foot dressings done by podiatry.

## 2019-11-07 NOTE — DISCHARGE NOTE NURSING/CASE MANAGEMENT/SOCIAL WORK - NSDCPECAREGIVERED_GEN_ALL_CORE
meds, I med info given. call md for sign of infection (temp greater than 100.4f, redness at incision, pain not relieved by meds). call md for follow up appt. drink adequate amount of fluid.

## 2019-11-07 NOTE — DISCHARGE NOTE PROVIDER - NSDCMRMEDTOKEN_GEN_ALL_CORE_FT
acetaminophen 160 mg/5 mL oral suspension: 20.31 milliliter(s) orally every 6 hours, As needed, Moderate Pain  Altace 5 mg oral tablet: 1 tab(s) orally once a day  amitriptyline 100 mg oral tablet: 1 tab(s) orally once a day (at bedtime)  Aspir 81 oral delayed release tablet: 1 tab(s) orally once a day  Cipro 500 mg oral tablet: 1 tab(s) orally every 12 hours x 5 days   GlipiZIDE XL 10 mg oral tablet, extended release: 1 tab(s) orally once a day  metformin 1000 mg oral tablet: 1 tab(s) orally 2 times a day  metoprolol succinate 50 mg oral tablet, extended release: 1 tab(s) orally once a day  Milk of Magnesia 8% oral suspension: 30 milliliter(s) orally once a day (at bedtime), As Needed  polyethylene glycol 3350 oral powder for reconstitution: 17 gram(s) orally once a day  polymyxin B-trimethoprim 10,000 units-1 mg/mL ophthalmic solution: 1 drop(s) to each affected eye 2 times a day x 4 more days   Toradol: 10 milligram(s) to each affected eye every 8 hours x 3 days then stop acetaminophen 325 mg oral tablet: 3 tab(s) orally every 6 hours, As needed, Mild Pain (1 - 3)  Altace 5 mg oral tablet: 1 tab(s) orally once a day  amitriptyline 100 mg oral tablet: 1 tab(s) orally once a day (at bedtime)  Aspir 81 oral delayed release tablet: 1 tab(s) orally once a day  GlipiZIDE XL 10 mg oral tablet, extended release: 1 tab(s) orally once a day  metformin 1000 mg oral tablet: 1 tab(s) orally 2 times a day  metoprolol succinate 50 mg oral tablet, extended release: 1 tab(s) orally once a day  Milk of Magnesia 8% oral suspension: 30 milliliter(s) orally once a day (at bedtime), As Needed  Motrin 600 mg oral tablet: 1 tab(s) orally every 6 hours, As Needed for moderate pain  polyethylene glycol 3350 oral powder for reconstitution: 17 gram(s) orally once a day  polymyxin B-trimethoprim 10,000 units-1 mg/mL ophthalmic solution: 1 drop(s) to each affected eye 2 times a day x 4 more days   Toradol: 10 milligram(s) to each affected eye every 8 hours x 3 days then stop

## 2019-11-07 NOTE — DISCHARGE NOTE NURSING/CASE MANAGEMENT/SOCIAL WORK - PATIENT PORTAL LINK FT
You can access the FollowMyHealth Patient Portal offered by [facility] by registering at the following website: [email or web address]. By joining [facility]’s FollowMyHealth portal, you will also be able to view your health information using other applications (apps) compatible with our system.

## 2019-11-07 NOTE — HISTORY OF PRESENT ILLNESS
[Moderate Bleeding] : described as moderate in severity [Pain] : pelvic pain [Dizziness] : no dizziness [Palpitations] : no palpitations [Menopausal Symptoms] : no manopausal symptoms

## 2019-11-07 NOTE — PROGRESS NOTE ADULT - ASSESSMENT
Assessment/Plan: 89y female HD#2 of admission for vaginal bleeding requiring packing and IR UAE s/p in office biopsy of fungating cervical mass on 11/5. S/p IR UAE 11/6 PM. Overnight agitation requiring medication and 1:1, now AOX4. Currently stable                 10.1   16.41 )-----------( 164      ( 11-07 @ 06:00 )             32.5                    11.9   13.77 )-----------( 213      ( 11-06 @ 14:52 )             39.2

## 2019-11-07 NOTE — PROGRESS NOTE ADULT - SUBJECTIVE AND OBJECTIVE BOX
Gyn ONC Progress Note HD#2    Subjective:   Pt seen and examined at bedside s/p IR UAE last night. Per IR note, L UAE and R internal iliac artery embolized through R obturator artery coil blockade. R groin skin shearing noted w/ dressing placed. Patient was reportedly agitated and combative after procedure requiring Precedex and 1:1 overnight for patient safety.    This AM, patient aox4. States she was a "crazy lady" last night. Feeling more like herself this morning. Pain well controlled. Reporting muscle pain from bed positioning. Pt denies fever, chills, chest pain, SOB, nausea, vomiting, lightheadedness, dizziness.          Objective:  T(F): 98 (11-07-19 @ 05:00), Max: 99.3 (11-06-19 @ 22:25)  HR: 90 (11-07-19 @ 05:00) (68 - 99)  BP: 150/97 (11-07-19 @ 05:00) (105/35 - 203/78)  RR: 15 (11-07-19 @ 05:00) (11 - 22)  SpO2: 98% (11-07-19 @ 05:00) (90% - 100%)  Wt(kg): --  I&O's Summary    05 Nov 2019 07:01  -  06 Nov 2019 07:00  --------------------------------------------------------  IN: 0 mL / OUT: 900 mL / NET: -900 mL    06 Nov 2019 07:01  -  07 Nov 2019 06:37  --------------------------------------------------------  IN: 300 mL / OUT: 1705 mL / NET: -1405 mL        POCT Blood Glucose.: 163 mg/dL (07 Nov 2019 01:59)  POCT Blood Glucose.: 196 mg/dL (06 Nov 2019 13:25)  POCT Blood Glucose.: 212 mg/dL (06 Nov 2019 10:11)      MEDICATIONS  (STANDING):  dextrose 5%. 1000 milliLiter(s) (50 mL/Hr) IV Continuous <Continuous>  dextrose 5%. 1000 milliLiter(s) (50 mL/Hr) IV Continuous <Continuous>  dextrose 50% Injectable 12.5 Gram(s) IV Push once  dextrose 50% Injectable 25 Gram(s) IV Push once  dextrose 50% Injectable 25 Gram(s) IV Push once  dextrose 50% Injectable 12.5 Gram(s) IV Push once  dextrose 50% Injectable 25 Gram(s) IV Push once  dextrose 50% Injectable 25 Gram(s) IV Push once  insulin glargine Injectable (LANTUS) 5 Unit(s) SubCutaneous at bedtime  insulin lispro (HumaLOG) corrective regimen sliding scale   SubCutaneous three times a day before meals  insulin lispro (HumaLOG) corrective regimen sliding scale   SubCutaneous at bedtime  lactated ringers. 1000 milliLiter(s) (75 mL/Hr) IV Continuous <Continuous>  metoprolol tartrate Injectable 5 milliGRAM(s) IV Push every 6 hours  silver sulfADIAZINE 1% Cream 1 Application(s) Topical daily    MEDICATIONS  (PRN):  acetaminophen   Tablet .. 975 milliGRAM(s) Oral every 6 hours PRN Mild Pain (1 - 3)  dextrose 40% Gel 15 Gram(s) Oral once PRN Blood Glucose LESS THAN 70 milliGRAM(s)/deciliter  dextrose 40% Gel 15 Gram(s) Oral once PRN Blood Glucose LESS THAN 70 milliGRAM(s)/deciliter  glucagon  Injectable 1 milliGRAM(s) IntraMuscular once PRN Glucose LESS THAN 70 milligrams/deciliter  glucagon  Injectable 1 milliGRAM(s) IntraMuscular once PRN Glucose LESS THAN 70 milligrams/deciliter  HYDROmorphone  Injectable 0.5 milliGRAM(s) IV Push every 10 minutes PRN Moderate Pain (4 - 6)  ondansetron Injectable 4 milliGRAM(s) IV Push once PRN Nausea and/or Vomiting      Physical Exam:  Constitutional: NAD, A+O x3, resting in bed  CV: RRR, no m/r/g  Lungs: clear to auscultation bilaterally, normal WOB  Abdomen: soft, nondistended, no guarding, no rebound, normal bowel sounds  : grullon in place  Groin: R groin with abrasion 2/2 IR UAE procedure. Dressing covering, no drainage, leaking, malodor, bleeding.   Vagina: vaginal packing in place  Extremities: no lower extremity edema or calf tenderness bilaterally; venodynes in place. Pedal pulses decreased, likely at baseline 2/2 diabetes. Foot ulcers present.         LABS:             10.1   16.41 )-----------( 164      ( 11-07 @ 06:00 )             32.5                    11.9   13.77 )-----------( 213      ( 11-06 @ 14:52 )             39.2

## 2019-11-07 NOTE — DISCHARGE NOTE PROVIDER - HOSPITAL COURSE
Patient is a 89yo w/PMH breast cancer and fibroid uterus sent in from benign GYN clinic with heavy vaginal bleeding s/p removal of aborting cervical mass on 11/5/2019. Vaginal packing placed on admission. Patient underwent a uterine artery embolization on 11/6/2019 and vaginal packing removed on 11/7/2019. Bleeding noted to be ... Patient discharged home on 11/?/2019 in stable condition with adequate pain control and minimal/no vaginal bleeding.         WBC:8.5->9.8->10.83->13.77->16.4          Hct: 42.1->35.6->36.5->39.2->10.1           Cr: 0.92->0.8->0.83 Patient is a 89yo w/PMH breast cancer and fibroid uterus sent in from benign GYN clinic with heavy vaginal bleeding s/p removal of aborting cervical mass on 11/5/2019. Vaginal packing placed on admission. Patient underwent a uterine artery embolization on 11/6/2019 and vaginal packing removed on 11/7/2019. No vaginal bleeding noted. Patient discharged home on 11/7/2019 in stable condition with adequate pain control and no vaginal bleeding.         WBC:8.5->9.8->10.83->13.77->16.4          Hct: 42.1->35.6->36.5->39.2->10.1           Cr: 0.92->0.8->0.83 Patient is a 89yo w/PMH breast cancer and fibroid uterus sent in from benign GYN clinic with heavy vaginal bleeding s/p removal of aborting cervical mass on 11/5/2019. Vaginal packing placed on admission. Patient underwent a uterine artery embolization on 11/6/2019 and vaginal packing removed on 11/7/2019. No vaginal bleeding noted. Pt seen by podiatry on 11/7/19 for evaluation of chronic feet wounds. XR ordered to rule out osteomyletis with results showing no concern for osteo. Patient discharged home on 11/8/2019 in stable condition with adequate pain control and no vaginal bleeding.                    10.1     16.41 )-----------( 164      ( 11-07 @ 06:00 )               32.5                    11.9     13.77 )-----------( 213      ( 11-06 @ 14:52 )               39.2                    11.7     10.83 )-----------( 211      ( 11-06 @ 05:51 )               36.5                    11.0     9.77  )-----------( 203      ( 11-06 @ 00:56 )               35.6                    13.4     8.47  )-----------( 232      ( 11[address]05 @ 20:45 )               42.1

## 2019-11-07 NOTE — PATIENT PROFILE ADULT - VISION (WITH CORRECTIVE LENSES IF THE PATIENT USUALLY WEARS THEM):
Partially impaired: cannot see medication labels or newsprint, but can see obstacles in path, and the surrounding layout; can count fingers at arm's length/macular degeneration

## 2019-11-07 NOTE — PROGRESS NOTE ADULT - ATTENDING COMMENTS
Pt seen and examined, agree with gyn housestaff  Events reviewed  Hgb stable, hemodynamically stable  Currently with minimal bleeding around packing  Patient refusing removal of packing  Discussed options of IR embolization, palliative RT  She is refusing options at this time and wants to speak to her niece
Patient seen and examined with housestaff team.   No longer bleeding and not interested in cancer directed treatment.   Anticipate discharge back to the nursing facility.

## 2019-11-08 VITALS
TEMPERATURE: 99 F | SYSTOLIC BLOOD PRESSURE: 147 MMHG | RESPIRATION RATE: 18 BRPM | DIASTOLIC BLOOD PRESSURE: 62 MMHG | OXYGEN SATURATION: 100 % | HEART RATE: 82 BPM

## 2019-11-08 LAB
ANION GAP SERPL CALC-SCNC: 11 MMO/L — SIGNIFICANT CHANGE UP (ref 7–14)
BUN SERPL-MCNC: 13 MG/DL — SIGNIFICANT CHANGE UP (ref 7–23)
CALCIUM SERPL-MCNC: 8.7 MG/DL — SIGNIFICANT CHANGE UP (ref 8.4–10.5)
CHLORIDE SERPL-SCNC: 96 MMOL/L — LOW (ref 98–107)
CO2 SERPL-SCNC: 27 MMOL/L — SIGNIFICANT CHANGE UP (ref 22–31)
CREAT SERPL-MCNC: 0.83 MG/DL — SIGNIFICANT CHANGE UP (ref 0.5–1.3)
GLUCOSE SERPL-MCNC: 283 MG/DL — HIGH (ref 70–99)
HCT VFR BLD CALC: 34.4 % — LOW (ref 34.5–45)
HGB BLD-MCNC: 10.8 G/DL — LOW (ref 11.5–15.5)
MAGNESIUM SERPL-MCNC: 1.9 MG/DL — SIGNIFICANT CHANGE UP (ref 1.6–2.6)
MCHC RBC-ENTMCNC: 28.5 PG — SIGNIFICANT CHANGE UP (ref 27–34)
MCHC RBC-ENTMCNC: 31.4 % — LOW (ref 32–36)
MCV RBC AUTO: 90.8 FL — SIGNIFICANT CHANGE UP (ref 80–100)
NRBC # FLD: 0 K/UL — SIGNIFICANT CHANGE UP (ref 0–0)
PHOSPHATE SERPL-MCNC: 2.7 MG/DL — SIGNIFICANT CHANGE UP (ref 2.5–4.5)
PLATELET # BLD AUTO: 194 K/UL — SIGNIFICANT CHANGE UP (ref 150–400)
PMV BLD: 10.2 FL — SIGNIFICANT CHANGE UP (ref 7–13)
POTASSIUM SERPL-MCNC: 4.2 MMOL/L — SIGNIFICANT CHANGE UP (ref 3.5–5.3)
POTASSIUM SERPL-SCNC: 4.2 MMOL/L — SIGNIFICANT CHANGE UP (ref 3.5–5.3)
RBC # BLD: 3.79 M/UL — LOW (ref 3.8–5.2)
RBC # FLD: 15.5 % — HIGH (ref 10.3–14.5)
SODIUM SERPL-SCNC: 134 MMOL/L — LOW (ref 135–145)
WBC # BLD: 14.51 K/UL — HIGH (ref 3.8–10.5)
WBC # FLD AUTO: 14.51 K/UL — HIGH (ref 3.8–10.5)

## 2019-11-08 PROCEDURE — 99232 SBSQ HOSP IP/OBS MODERATE 35: CPT

## 2019-11-08 RX ADMIN — Medication 2: at 12:20

## 2019-11-08 RX ADMIN — Medication 50 MILLIGRAM(S): at 00:18

## 2019-11-08 RX ADMIN — OXYCODONE HYDROCHLORIDE 2.5 MILLIGRAM(S): 5 TABLET ORAL at 10:06

## 2019-11-08 RX ADMIN — LISINOPRIL 5 MILLIGRAM(S): 2.5 TABLET ORAL at 06:30

## 2019-11-08 RX ADMIN — HEPARIN SODIUM 5000 UNIT(S): 5000 INJECTION INTRAVENOUS; SUBCUTANEOUS at 05:44

## 2019-11-08 RX ADMIN — Medication 2: at 08:50

## 2019-11-08 RX ADMIN — OXYCODONE HYDROCHLORIDE 2.5 MILLIGRAM(S): 5 TABLET ORAL at 11:00

## 2019-11-08 NOTE — PROGRESS NOTE ADULT - SUBJECTIVE AND OBJECTIVE BOX
Podiatry pager #: [phone] ([place])/ [phone] ([facility])    Patient is a 89y old  Female who presents with a chief complaint of vaginal bleeding (08 Nov 2019 06:22)       INTERVAL HPI/OVERNIGHT EVENTS:  Patient seen and evaluated at bedside.  Pt is resting comfortable in NAD. Denies N/V/F/C.     Allergies    No Known Allergies    Intolerances        Vital Signs Last 24 Hrs  T(C): 36.8 (08 Nov 2019 05:41), Max: 37.2 (08 Nov 2019 01:00)  T(F): 98.3 (08 Nov 2019 05:41), Max: 99 (08 Nov 2019 01:00)  HR: 93 (08 Nov 2019 05:41) (83 - 96)  BP: 169/57 (08 Nov 2019 05:41) (128/49 - 178/57)  BP(mean): --  RR: 17 (08 Nov 2019 05:41) (16 - 18)  SpO2: 95% (08 Nov 2019 05:41) (94% - 100%)    LABS:                        10.1   16.41 )-----------( 164      ( 07 Nov 2019 06:00 )             32.5     11-07    136  |  99  |  15  ----------------------------<  183<H>  3.9   |  26  |  0.83    Ca    8.4      07 Nov 2019 06:00  Phos  3.5     11-07  Mg     1.7     11-07          CAPILLARY BLOOD GLUCOSE      POCT Blood Glucose.: 234 mg/dL (08 Nov 2019 08:07)  POCT Blood Glucose.: 194 mg/dL (07 Nov 2019 21:42)  POCT Blood Glucose.: 215 mg/dL (07 Nov 2019 17:28)  POCT Blood Glucose.: 229 mg/dL (07 Nov 2019 12:06)      Lower Extremity Physical Exam:    Vascular: DP/PT 0/4, B/L, CFT <3 seconds B/L, Temperature gradient warm to cool B/L.   Neuro: Epicritic sensation absent to the level of akles B/L.  Musculoskeletal/Ortho: pes planus with abducted foot type & contracted foot type  Skin:     Wound 1: Right foot medial ankle wound, measuring 4.0x3.5cm, depth to subq, wound bed fibrogranular, serous drainage, no malodor, periwound normal, etiology 2/2 vascular insufficiency    Wound 2: R medial midfoot wound, measuring 1.0x1.0cm, depth to subq, wound bed fibrous, no malodor, no probe to bone, fibrous drainage, etiology 2/2 vascular insufficiency and pes planus deformity    Wound 3: Left foot plantar medial midfoot wound, 1.5x1.5cm, depth to subq, fibrotic wound bed, no malodor, no probe to bone, serous drainage, etiology 2/2 vascular insufficiency and pes planus deformity    Wound 4: distal plantar hallucal wound, 0.5x0.5cm, depth to subq, no active drainage, fibrogranular wound bed, no malodor, serous drainage, etiology 2/2 hammered hallux deformity and vascular deformity  RADIOLOGY & ADDITIONAL TESTS:  < from: Xray Foot AP + Lateral + Oblique, Bilat (11.07.19 @ 15:48) >  EXAM:  RAD FOOT MIN 3 VIEWS BI        PROCEDURE DATE:  Nov 7 2019         INTERPRETATION:  CLINICAL INDICATION: bilateral foot wounds; evaluate for   osteomyelitis    EXAM:  Frontal, oblique, and lateral views of both feet from 11/7/2019 at 1548.   No similar prior studies available for comparison.    IMPRESSION:  Incompletely imaged left ankle fracture fixation hardware consisting of a   distal fibular fracture fixation plate with cortical fixation screws and   4 syndesmotic screws. The distal 2 syndesmotic screws are fracture.   Intact remaining visualized hardware. Healed and anatomically aligned   underlying prior fracture repair site.    Old healed distal right 2nd 3rd and 4th and left 4th and 5th metatarsal   neck region fracture deformities. Notable chronic exuberant periosteal   bone formation around the old right 2nd metatarsal shaft fracture site.   No acute appearing fractures.    Right foot claw toe deformities. Age indeterminate left 2nd and 3rd toe   dislocations.    Softtissue defects/ulcerations over medial left hallux and medial left   1st MTP region and medial right hallux IP joint region and medial right   navicular. No tracking gas collections beyond these regions and no   definite radiographic evidence for underlying osteomyelitis.    Tarsometatarsal alignment maintained without evidence for a Lisfranc   injury.    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    Bilateral calcaneal enthesophytes.    Generalized osteopenia otherwise no discrete suspicious lytic or blastic   lesions.                      LORENA HUERTAS M.D., ATTENDING RADIOLOGIST  This document has been electronically signed. Nov 7 2019  4:44PM                < end of copied text >

## 2019-11-08 NOTE — PROGRESS NOTE ADULT - REASON FOR ADMISSION
c/s for B/L UAE
c/s for UAE
c/s for uterine hemorrhage
vaginal bleeding
vaginal bleeding
vaginal bleeding requiring packing

## 2019-11-08 NOTE — PROVIDER CONTACT NOTE (OTHER) - RECOMMENDATIONS
DC metoprolol and give Hydralazine IVP. Continue to monitor
BP med held according to parameter. Diastolic below 60
BP med held due to parameter
Re-order CBC

## 2019-11-08 NOTE — PROVIDER CONTACT NOTE (OTHER) - ASSESSMENT
Pt is in no acute distress
/57  HR - 93  O2 - 95  T- 98.3
Patient AM CBC clotted as per hematology.
Patient BP elevated. 178/57  HR 96  O2 - 94 - RA  T - 99

## 2019-11-08 NOTE — PROGRESS NOTE ADULT - ASSESSMENT
Assessment/Plan: 89y female HD#3 of admission for vaginal bleeding requiring packing and IR UAE s/p in office biopsy of fungating cervical mass on 11/5. S/p IR UAE 11/6 PM.  Patient not interested in cancer treatment at this time. Discharge planning for today back to nursing home.

## 2019-11-08 NOTE — PROGRESS NOTE ADULT - SUBJECTIVE AND OBJECTIVE BOX
Gyn ONC Progress Note HD#3    Subjective:   Pt seen and examined at bedside. No events overnight. Pain well controlled. Denies vaginal bleeding. Alert and oriented.   Tolerating regular diet. Pt denies fever, chills, chest pain, SOB, nausea, vomiting, lightheadedness, dizziness.      Objective:  T(F): 98.3 (11-08-19 @ 05:41), Max: 99 (11-08-19 @ 01:00)  HR: 93 (11-08-19 @ 05:41) (83 - 96)  BP: 169/57 (11-08-19 @ 05:41) (128/49 - 178/57)  RR: 17 (11-08-19 @ 05:41) (16 - 18)  SpO2: 95% (11-08-19 @ 05:41) (94% - 100%)  Wt(kg): --  I&O's Summary    06 Nov 2019 07:01  -  07 Nov 2019 07:00  --------------------------------------------------------  IN: 600 mL / OUT: 1705 mL / NET: -1105 mL    07 Nov 2019 07:01  -  08 Nov 2019 06:22  --------------------------------------------------------  IN: 0 mL / OUT: 1300 mL / NET: -1300 mL      CAPILLARY BLOOD GLUCOSE  169 (07 Nov 2019 08:30)      POCT Blood Glucose.: 194 mg/dL (07 Nov 2019 21:42)  POCT Blood Glucose.: 215 mg/dL (07 Nov 2019 17:28)  POCT Blood Glucose.: 229 mg/dL (07 Nov 2019 12:06)  POCT Blood Glucose.: 169 mg/dL (07 Nov 2019 08:16)      MEDICATIONS  (STANDING):  dextrose 5%. 1000 milliLiter(s) (50 mL/Hr) IV Continuous <Continuous>  dextrose 5%. 1000 milliLiter(s) (50 mL/Hr) IV Continuous <Continuous>  dextrose 50% Injectable 12.5 Gram(s) IV Push once  dextrose 50% Injectable 25 Gram(s) IV Push once  dextrose 50% Injectable 25 Gram(s) IV Push once  dextrose 50% Injectable 12.5 Gram(s) IV Push once  dextrose 50% Injectable 25 Gram(s) IV Push once  dextrose 50% Injectable 25 Gram(s) IV Push once  heparin  Injectable 5000 Unit(s) SubCutaneous every 8 hours  insulin glargine Injectable (LANTUS) 5 Unit(s) SubCutaneous at bedtime  insulin lispro (HumaLOG) corrective regimen sliding scale   SubCutaneous three times a day before meals  insulin lispro (HumaLOG) corrective regimen sliding scale   SubCutaneous at bedtime  lisinopril 5 milliGRAM(s) Oral daily  metoprolol succinate ER 50 milliGRAM(s) Oral daily  silver sulfADIAZINE 1% Cream 1 Application(s) Topical daily    MEDICATIONS  (PRN):  acetaminophen   Tablet .. 975 milliGRAM(s) Oral every 6 hours PRN Mild Pain (1 - 3)  dextrose 40% Gel 15 Gram(s) Oral once PRN Blood Glucose LESS THAN 70 milliGRAM(s)/deciliter  dextrose 40% Gel 15 Gram(s) Oral once PRN Blood Glucose LESS THAN 70 milliGRAM(s)/deciliter  glucagon  Injectable 1 milliGRAM(s) IntraMuscular once PRN Glucose LESS THAN 70 milligrams/deciliter  glucagon  Injectable 1 milliGRAM(s) IntraMuscular once PRN Glucose LESS THAN 70 milligrams/deciliter  ondansetron Injectable 4 milliGRAM(s) IV Push once PRN Nausea and/or Vomiting  oxyCODONE    IR 2.5 milliGRAM(s) Oral every 6 hours PRN Moderate Pain (4 - 6)  oxyCODONE    IR 5 milliGRAM(s) Oral every 6 hours PRN Severe Pain (7 - 10)      Physical Exam:  Constitutional: NAD, A+O x3  CV: RRR  Lungs: clear to auscultation bilaterally  Abdomen: soft, nondistended, no guarding, no rebound, normal bowel sounds  Incision: clean, dry, intact  Extremities: no lower extremity edema or calf tenderness bilaterally; venodynes in place Gyn ONC Progress Note HD#3    Subjective:   Pt seen and examined at bedside. No events overnight. Pain well controlled. Denies vaginal bleeding. Alert and oriented. Per Podiatry note, low concern for osteomyelitis with PE and XR results, okay with discharging patient.   Tolerating regular diet. Pt denies fever, chills, chest pain, SOB, nausea, vomiting, lightheadedness, dizziness.      Objective:  T(F): 98.3 (11-08-19 @ 05:41), Max: 99 (11-08-19 @ 01:00)  HR: 93 (11-08-19 @ 05:41) (83 - 96)  BP: 169/57 (11-08-19 @ 05:41) (128/49 - 178/57)  RR: 17 (11-08-19 @ 05:41) (16 - 18)  SpO2: 95% (11-08-19 @ 05:41) (94% - 100%)  Wt(kg): --  I&O's Summary    06 Nov 2019 07:01  -  07 Nov 2019 07:00  --------------------------------------------------------  IN: 600 mL / OUT: 1705 mL / NET: -1105 mL    07 Nov 2019 07:01  -  08 Nov 2019 06:22  --------------------------------------------------------  IN: 0 mL / OUT: 1300 mL / NET: -1300 mL      CAPILLARY BLOOD GLUCOSE  169 (07 Nov 2019 08:30)      POCT Blood Glucose.: 194 mg/dL (07 Nov 2019 21:42)  POCT Blood Glucose.: 215 mg/dL (07 Nov 2019 17:28)  POCT Blood Glucose.: 229 mg/dL (07 Nov 2019 12:06)  POCT Blood Glucose.: 169 mg/dL (07 Nov 2019 08:16)      MEDICATIONS  (STANDING):  dextrose 5%. 1000 milliLiter(s) (50 mL/Hr) IV Continuous <Continuous>  dextrose 5%. 1000 milliLiter(s) (50 mL/Hr) IV Continuous <Continuous>  dextrose 50% Injectable 12.5 Gram(s) IV Push once  dextrose 50% Injectable 25 Gram(s) IV Push once  dextrose 50% Injectable 25 Gram(s) IV Push once  dextrose 50% Injectable 12.5 Gram(s) IV Push once  dextrose 50% Injectable 25 Gram(s) IV Push once  dextrose 50% Injectable 25 Gram(s) IV Push once  heparin  Injectable 5000 Unit(s) SubCutaneous every 8 hours  insulin glargine Injectable (LANTUS) 5 Unit(s) SubCutaneous at bedtime  insulin lispro (HumaLOG) corrective regimen sliding scale   SubCutaneous three times a day before meals  insulin lispro (HumaLOG) corrective regimen sliding scale   SubCutaneous at bedtime  lisinopril 5 milliGRAM(s) Oral daily  metoprolol succinate ER 50 milliGRAM(s) Oral daily  silver sulfADIAZINE 1% Cream 1 Application(s) Topical daily    MEDICATIONS  (PRN):  acetaminophen   Tablet .. 975 milliGRAM(s) Oral every 6 hours PRN Mild Pain (1 - 3)  dextrose 40% Gel 15 Gram(s) Oral once PRN Blood Glucose LESS THAN 70 milliGRAM(s)/deciliter  dextrose 40% Gel 15 Gram(s) Oral once PRN Blood Glucose LESS THAN 70 milliGRAM(s)/deciliter  glucagon  Injectable 1 milliGRAM(s) IntraMuscular once PRN Glucose LESS THAN 70 milligrams/deciliter  glucagon  Injectable 1 milliGRAM(s) IntraMuscular once PRN Glucose LESS THAN 70 milligrams/deciliter  ondansetron Injectable 4 milliGRAM(s) IV Push once PRN Nausea and/or Vomiting  oxyCODONE    IR 2.5 milliGRAM(s) Oral every 6 hours PRN Moderate Pain (4 - 6)  oxyCODONE    IR 5 milliGRAM(s) Oral every 6 hours PRN Severe Pain (7 - 10)      Physical Exam:  Constitutional: NAD, A+O x3  CV: RRR  Lungs: clear to auscultation bilaterally  Abdomen: soft, nondistended, no guarding, no rebound, normal bowel sounds  Incision: clean, dry, intact  Extremities: no lower extremity edema or calf tenderness bilaterally; venodynes in place Gyn ONC Progress Note HD#3    Subjective:   Pt seen and examined at bedside. No events overnight. Pain well controlled. Denies vaginal bleeding. Alert and oriented. Per Podiatry note, low concern for osteomyelitis with PE and XR results, okay with discharging patient.   Tolerating regular diet. Pt denies fever, chills, chest pain, SOB, nausea, vomiting, lightheadedness, dizziness.        Objective:  T(F): 98.3 (11-08-19 @ 05:41), Max: 99 (11-08-19 @ 01:00)  HR: 93 (11-08-19 @ 05:41) (83 - 96)  BP: 169/57 (11-08-19 @ 05:41) (128/49 - 178/57)  RR: 17 (11-08-19 @ 05:41) (16 - 18)  SpO2: 95% (11-08-19 @ 05:41) (94% - 100%)  Wt(kg): --  I&O's Summary    06 Nov 2019 07:01  -  07 Nov 2019 07:00  --------------------------------------------------------  IN: 600 mL / OUT: 1705 mL / NET: -1105 mL    07 Nov 2019 07:01  -  08 Nov 2019 06:22  --------------------------------------------------------  IN: 0 mL / OUT: 1300 mL / NET: -1300 mL      CAPILLARY BLOOD GLUCOSE  169 (07 Nov 2019 08:30)      POCT Blood Glucose.: 194 mg/dL (07 Nov 2019 21:42)  POCT Blood Glucose.: 215 mg/dL (07 Nov 2019 17:28)  POCT Blood Glucose.: 229 mg/dL (07 Nov 2019 12:06)  POCT Blood Glucose.: 169 mg/dL (07 Nov 2019 08:16)      MEDICATIONS  (STANDING):  dextrose 5%. 1000 milliLiter(s) (50 mL/Hr) IV Continuous <Continuous>  dextrose 5%. 1000 milliLiter(s) (50 mL/Hr) IV Continuous <Continuous>  dextrose 50% Injectable 12.5 Gram(s) IV Push once  dextrose 50% Injectable 25 Gram(s) IV Push once  dextrose 50% Injectable 25 Gram(s) IV Push once  dextrose 50% Injectable 12.5 Gram(s) IV Push once  dextrose 50% Injectable 25 Gram(s) IV Push once  dextrose 50% Injectable 25 Gram(s) IV Push once  heparin  Injectable 5000 Unit(s) SubCutaneous every 8 hours  insulin glargine Injectable (LANTUS) 5 Unit(s) SubCutaneous at bedtime  insulin lispro (HumaLOG) corrective regimen sliding scale   SubCutaneous three times a day before meals  insulin lispro (HumaLOG) corrective regimen sliding scale   SubCutaneous at bedtime  lisinopril 5 milliGRAM(s) Oral daily  metoprolol succinate ER 50 milliGRAM(s) Oral daily  silver sulfADIAZINE 1% Cream 1 Application(s) Topical daily    MEDICATIONS  (PRN):  acetaminophen   Tablet .. 975 milliGRAM(s) Oral every 6 hours PRN Mild Pain (1 - 3)  dextrose 40% Gel 15 Gram(s) Oral once PRN Blood Glucose LESS THAN 70 milliGRAM(s)/deciliter  dextrose 40% Gel 15 Gram(s) Oral once PRN Blood Glucose LESS THAN 70 milliGRAM(s)/deciliter  glucagon  Injectable 1 milliGRAM(s) IntraMuscular once PRN Glucose LESS THAN 70 milligrams/deciliter  glucagon  Injectable 1 milliGRAM(s) IntraMuscular once PRN Glucose LESS THAN 70 milligrams/deciliter  ondansetron Injectable 4 milliGRAM(s) IV Push once PRN Nausea and/or Vomiting  oxyCODONE    IR 2.5 milliGRAM(s) Oral every 6 hours PRN Moderate Pain (4 - 6)  oxyCODONE    IR 5 milliGRAM(s) Oral every 6 hours PRN Severe Pain (7 - 10)      Physical Exam:  Constitutional: NAD, A+O x3  CV: RRR  Lungs: clear to auscultation bilaterally  Abdomen: soft, nondistended, no guarding, no rebound, normal bowel sounds  : No vaginal bleeding present. R groin wound erythematous, unchanged from yesterday.   Extremities: no lower extremity edema or calf tenderness bilaterally; venodynes in place

## 2019-11-08 NOTE — PROGRESS NOTE ADULT - PROBLEM SELECTOR PLAN 1
CV: Hemodynamically stable. PMH HTN, transitioned to home Lisinopril and Metoprolol yesterday. Consistently elevated systolic BPs 150-160s with medication.   Pulm: Saturating well on RA. Increase incentive spirometry.  GI: Reg diet. SLIV. Tolerating well.   : Voiding spontaneously and adequately. S/p vaginal packing. No vaginal bleeding present. Monitor R groin abrasion.   Endo: PMH T2DM - Lantus 5 and ISS. -200.   Heme: Continue HSQ and vnodynes for DVT ppx. Increase OOB.    Neuro: AOx4. Continue tylenol for pain.   Extremities: XR wnl, no osteomyelitis present per Podiatry Attending.    Del Porter PGY1 CV: Hemodynamically stable. PMH HTN, transitioned to home Lisinopril and Metoprolol yesterday. Consistently elevated systolic BPs 150-160s with medication.   Pulm: Saturating well on RA. Increase incentive spirometry.  GI: Reg diet. SLIV. Tolerating well.   : Voiding spontaneously and adequately. S/p vaginal packing. No vaginal bleeding present. Monitor R groin abrasion - unchanged this morning. Will discuss dressings with Wound Care for outpatient.  Endo: PMH T2DM - Lantus 5 and ISS. -200.   Heme: Continue HSQ and vnodynes for DVT ppx. Increase OOB.    Neuro: AOx4. Continue tylenol for pain.   Extremities: XR wnl, no osteomyelitis present per Podiatry Attending.    Del Porter PGY1

## 2019-11-08 NOTE — PROGRESS NOTE ADULT - ASSESSMENT
89F w/ significant PMHx w/ podiatric c/c of b/l foot wounds  - Pt seen and evaluated in ED   - Vitals stable, WBC 16.41, ESR 59 crp 15.7  - Right foot medial ankle wound, measuring 4.0x3.5cm, depth to subq, wound bed fibrogranular, serous drainage, no malodor, periwound normal, etiology 2/2 vascular insufficiency; R medial midfoot wound, measuring 1.0x1.0cm, depth to subq, wound bed fibrous, no malodor, no probe to bone, fibrous drainage, etiology 2/2 vascular insufficiency and pes planus deformity;  - Left foot plantar medial midfoot wound, 1.5x1.5cm, depth to subq, fibrotic wound bed, no malodor, no probe to bone, serous drainage, etiology 2/2 vascular insufficiency and pes planus deformity; Distal plantar hallucal wound, 0.5x0.5cm, depth to bone, no active drainage, fibrogranular wound bed, no malodor, serous drainage, etiology 2/2 hammered hallux deformity and vascular deformity  - Wounds demonstrate no acute signs of infection, no purulent drainage, no concern for infection of pedal wounds  - XR - negative for OM   - Podiatry stable for discharge  - d/w attending

## 2019-11-19 LAB — SURGICAL PATHOLOGY STUDY: SIGNIFICANT CHANGE UP

## 2019-11-20 ENCOUNTER — APPOINTMENT (OUTPATIENT)
Dept: GYNECOLOGIC ONCOLOGY | Facility: HOSPITAL | Age: 84
End: 2019-11-20
Payer: MEDICARE

## 2019-11-20 ENCOUNTER — OUTPATIENT (OUTPATIENT)
Dept: OUTPATIENT SERVICES | Facility: HOSPITAL | Age: 84
LOS: 1 days | End: 2019-11-20

## 2019-11-20 VITALS
HEART RATE: 57 BPM | BODY MASS INDEX: 34.55 KG/M2 | DIASTOLIC BLOOD PRESSURE: 63 MMHG | SYSTOLIC BLOOD PRESSURE: 150 MMHG | WEIGHT: 195 LBS | HEIGHT: 63 IN

## 2019-11-20 VITALS
HEIGHT: 63 IN | SYSTOLIC BLOOD PRESSURE: 155 MMHG | BODY MASS INDEX: 31.71 KG/M2 | WEIGHT: 179 LBS | DIASTOLIC BLOOD PRESSURE: 88 MMHG | HEART RATE: 79 BPM

## 2019-11-20 DIAGNOSIS — C54.1 MALIGNANT NEOPLASM OF ENDOMETRIUM: ICD-10-CM

## 2019-11-20 PROCEDURE — 99204 OFFICE O/P NEW MOD 45 MIN: CPT | Mod: GC

## 2019-11-20 NOTE — PHYSICAL EXAM
[Normal] : Examination for hernias: No hernia appreciated [Capable of only limited self care, confined to bed or chair more than 50% of waking hours] : Status 3- Capable of only limited self care, confined to bed or chair more than 50% of waking hours

## 2019-11-20 NOTE — HISTORY OF PRESENT ILLNESS
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 Patient initially presented to GYN clinic early November for intermittent postmenopausal bleeding. \par Pt reports h/o "uterine mass" 18 yrs ago and was taken to OR previously but operation aborted in OR due to ?unstable vitals. \par \par Office biopsy of aborting uterine mass performed 11/5, as a result of which patient developed uncontrolled bleeding, sent to the ER, requiring UAE (11/6). \par Patient denies further bleeding after UAE. \par She denies changes in weight, anorexia, night sweats, fevers, chills, abdominal pain, constipation, diarrhea, CP, SOB, lightheadedness, dizziness She denies sig known Fhx of cancer in her family. \par Pt lives in a nursing home and states that she does not desire any aggressive treatments. \par \par Vaginal mass biopsy (11/5) \par High grade adenocarcinoma, most consistent with endometrioid\par adenocarcinoma, FIGO grade II \par \par CTAP (11/6):\par Fibroid uterus. High density material within the \par distended vagina likely a combination of blood products and vaginal \par packing. A densely calcified mass in the anterior left abdomen, separate \par from the uterus measuring 6.1 x 3.7 cm, which appears to drain via the \par left gonadal vein and may represent calcified ovary.\par \par POBhx: G0\par PGynhx: "mass in uterus", only had a few pap smears in her lifetime\par PMH: Breast cancer 1968, 1978 s/p R radical mastectomy and L modified mastectomy, uncontrolled T2DM c/b neuropathy and recurrent non-healing ulcers of LE, anxiety, HTN, PVD, GERD, overactive bladder, OA, cardiac stent\par PSH: none\par FH: denies family history of breast, ovarian, uterine, cervical, colon cancer\par All: none\par Social: Lives in a nursing home, used to smoke 3 packs/day for 50 years \par \par

## 2019-11-20 NOTE — DISCUSSION/SUMMARY
[Reviewed Clinical Lab Test(s)] : Results of clinical tests were reviewed. [Reviewed Radiology Film/Image(s)] : Images from radiology studies were reviewed and examined. [Reviewed Radiology Report(s)] : Radiology reports were reviewed. [FreeTextEntry1] : 89 P0 female PMHx of breast cancer (1968) s/p R radical mastectomy and L modified mastectomy, uncontrolled T2DM, HTN, CAD, PVD, cardiac stent referred from GYN clinic for dx of high grade adenocarcinoma, most c/w endometrioid adenocarcinoma, FIGO grade II. \par Patient seen and evaluated with Dr. Goldberg. \par Diagnosis and options of surgery and radiation therapy discussed with patient. \par Patient stresses that she is 89 yrs old and does not want any aggressive treatments. \par She declines surgery. \par Probable progression of disease and further vaginal bleeding discussed with patient. \par In discussion of goals of care, patient was recommended radiation therapy which may reduce tumor burden and control vaginal bleeding. \par Patient provided Rad Onc referral and will follow up. \par \par Kermit Valdez MD PGY2\par Patient seen and evaluated with Dr. Goldberg \par \par

## 2019-11-21 DIAGNOSIS — C54.1 MALIGNANT NEOPLASM OF ENDOMETRIUM: ICD-10-CM

## 2019-12-06 NOTE — REASON FOR VISIT
[Endometrial Cancer] : endometrial cancer [Consideration of Curative Therapy] : consideration of curative therapy for

## 2019-12-10 ENCOUNTER — OUTPATIENT (OUTPATIENT)
Dept: OUTPATIENT SERVICES | Facility: HOSPITAL | Age: 84
LOS: 1 days | Discharge: HOME | End: 2019-12-10

## 2019-12-11 ENCOUNTER — APPOINTMENT (OUTPATIENT)
Dept: RADIATION ONCOLOGY | Facility: CLINIC | Age: 84
End: 2019-12-11
Payer: MEDICARE

## 2019-12-11 VITALS
WEIGHT: 195 LBS | HEART RATE: 60 BPM | RESPIRATION RATE: 16 BRPM | SYSTOLIC BLOOD PRESSURE: 167 MMHG | BODY MASS INDEX: 34.54 KG/M2 | OXYGEN SATURATION: 92 % | DIASTOLIC BLOOD PRESSURE: 87 MMHG | TEMPERATURE: 97.7 F

## 2019-12-11 DIAGNOSIS — Z78.9 OTHER SPECIFIED HEALTH STATUS: ICD-10-CM

## 2019-12-11 DIAGNOSIS — N93.9 ABNORMAL UTERINE AND VAGINAL BLEEDING, UNSPECIFIED: ICD-10-CM

## 2019-12-11 PROCEDURE — 99203 OFFICE O/P NEW LOW 30 MIN: CPT | Mod: 25

## 2019-12-11 NOTE — REVIEW OF SYSTEMS
[Vaginal Discharge] : vaginal discharge [Loss of Hearing] : loss of hearing [Muscle Pain] : muscle pain [Muscle Weakness] : muscle weakness [Joint Pain] : joint pain [Gait Disturbance] : gait disturbance [Negative] : Constitutional [Constipation: Grade 1 - Occasional or intermittent symptoms; occasional use of stool softeners, laxatives, dietary modification, or enema] : Constipation: Grade 1 - Occasional or intermittent symptoms; occasional use of stool softeners, laxatives, dietary modification, or enema [Diarrhea: Grade 1 - Increase of <4 stools per day over baseline; mild increase in ostomy output compared to baseline] : Diarrhea: Grade 1 - Increase of <4 stools per day over baseline; mild increase in ostomy output compared to baseline [Urinary Incontinence: Grade 2 - Spontaneous; pads indicated; limiting instrumental ADL] : Urinary Incontinence: Grade 2 - Spontaneous; pads indicated; limiting instrumental ADL

## 2019-12-11 NOTE — VITALS
[Maximal Pain Intensity: 10/10] : 10/10 [50: Requires considerable assistance and frequent medical care.] : 50: Requires considerable assistance and frequent medical care. [Least Pain Intensity: 6/10] : 6/10 [ECOG Performance Status: 3 - Capable of only limited self care, confined to bed or chair more than 50% of waking hours] : Performance Status: 3 - Capable of only limited self care, confined to bed or chair more than 50% of waking hours [Date: ____________] : Patient's last distress assessment performed on [unfilled]. [Pain Location: ___] : Pain Location: [unfilled]

## 2019-12-15 NOTE — HISTORY OF PRESENT ILLNESS
[FreeTextEntry1] : Ms. Rivera is a 89yo P0 female who presents for evaluation of RT for endometrial cancer. \par \par Her oncologic history is as follows: \par \par PMHx of breast cancer (1969 and 1979) s/p R radical mastectomy and L modified mastectomy \par \par Pt reports h/o "uterine mass" 18 yrs ago and was taken to OR previously but operation aborted in OR due to unstable vitals per patient. \par \par She noted PMB and vaginal mucus discharge over the past year that was on and off and presented to the OBGYN 11/5/19. 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  Uterine artery embolization for the bleeding was done on 11/6/19, vaginal packing was removed on 11/7/19 and no bleeding was noted afterwards. \par  CT CAP: Fibroid uterus. High density material within the distended vagina likely a combination of blood products and vaginal packing. A densely calcified mass in the anterior left abdomen, separate from the uterus measuring 6.1 x 3.7 cm, which appears to drain via the left gonadal vein and may represent calcified ovary. \par \par 11/20/19: She met with Dr. Goldberg in [facility] who recommended surgery, but she refused as she does not want any invasive treatment.  \par Today she reports vaginal bleeding ceased post embolization at [facility] during hospitalization. She denies pelvic pain, some residual pain from right groin, site dressing is clean. She wears briefs for night time incontinence. Wheelchair bound. She is resident at [facility] rehab/nursing home.

## 2019-12-15 NOTE — OB/GYN HISTORY
[Definite:  ___ (Date)] : the last menstrual period was [unfilled] [Menopause Age: ____] : patient was [unfilled] years old at menopause [___] : Total Pregnancies: [unfilled] [History of Birth Control Pills] : Patient has no history of taking birth control pills [History of Hormone Replacement Therapy] : no history of hormone replacement therapy

## 2020-03-02 ENCOUNTER — INPATIENT (INPATIENT)
Facility: HOSPITAL | Age: 85
LOS: 3 days | DRG: 853 | End: 2020-03-06
Attending: INTERNAL MEDICINE | Admitting: HOSPITALIST
Payer: COMMERCIAL

## 2020-03-02 VITALS — OXYGEN SATURATION: 100 % | RESPIRATION RATE: 26 BRPM | HEART RATE: 105 BPM

## 2020-03-02 LAB
ALBUMIN SERPL ELPH-MCNC: 2.4 G/DL — LOW (ref 3.3–5)
ALP SERPL-CCNC: 73 U/L — SIGNIFICANT CHANGE UP (ref 40–120)
ALT FLD-CCNC: 9 U/L — LOW (ref 10–45)
ANION GAP SERPL CALC-SCNC: 17 MMOL/L — SIGNIFICANT CHANGE UP (ref 5–17)
APPEARANCE UR: ABNORMAL
AST SERPL-CCNC: 19 U/L — SIGNIFICANT CHANGE UP (ref 10–40)
BACTERIA # UR AUTO: ABNORMAL
BILIRUB SERPL-MCNC: 0.4 MG/DL — SIGNIFICANT CHANGE UP (ref 0.2–1.2)
BILIRUB UR-MCNC: NEGATIVE — SIGNIFICANT CHANGE UP
BUN SERPL-MCNC: 66 MG/DL — HIGH (ref 7–23)
CALCIUM SERPL-MCNC: 8.3 MG/DL — LOW (ref 8.4–10.5)
CHLORIDE SERPL-SCNC: 104 MMOL/L — SIGNIFICANT CHANGE UP (ref 96–108)
CO2 SERPL-SCNC: 22 MMOL/L — SIGNIFICANT CHANGE UP (ref 22–31)
COLOR SPEC: ABNORMAL
CREAT SERPL-MCNC: 3.01 MG/DL — HIGH (ref 0.5–1.3)
DIFF PNL FLD: ABNORMAL
EPI CELLS # UR: 5 — SIGNIFICANT CHANGE UP
GLUCOSE SERPL-MCNC: 181 MG/DL — HIGH (ref 70–99)
GLUCOSE UR QL: ABNORMAL
HCT VFR BLD CALC: 30.6 % — LOW (ref 34.5–45)
HGB BLD-MCNC: 9 G/DL — LOW (ref 11.5–15.5)
HYALINE CASTS # UR AUTO: 6 /LPF — HIGH (ref 0–7)
KETONES UR-MCNC: NEGATIVE — SIGNIFICANT CHANGE UP
LEUKOCYTE ESTERASE UR-ACNC: ABNORMAL
MCHC RBC-ENTMCNC: 25.8 PG — LOW (ref 27–34)
MCHC RBC-ENTMCNC: 29.4 GM/DL — LOW (ref 32–36)
MCV RBC AUTO: 87.7 FL — SIGNIFICANT CHANGE UP (ref 80–100)
NITRITE UR-MCNC: NEGATIVE — SIGNIFICANT CHANGE UP
PH UR: 6 — SIGNIFICANT CHANGE UP (ref 5–8)
PLATELET # BLD AUTO: 164 K/UL — SIGNIFICANT CHANGE UP (ref 150–400)
POTASSIUM SERPL-MCNC: 4.3 MMOL/L — SIGNIFICANT CHANGE UP (ref 3.5–5.3)
POTASSIUM SERPL-SCNC: 4.3 MMOL/L — SIGNIFICANT CHANGE UP (ref 3.5–5.3)
PROT SERPL-MCNC: 6 G/DL — SIGNIFICANT CHANGE UP (ref 6–8.3)
PROT UR-MCNC: 100 — SIGNIFICANT CHANGE UP
RBC # BLD: 3.49 M/UL — LOW (ref 3.8–5.2)
RBC # FLD: 17.6 % — HIGH (ref 10.3–14.5)
RBC CASTS # UR COMP ASSIST: >50 /HPF — HIGH (ref 0–4)
SODIUM SERPL-SCNC: 143 MMOL/L — SIGNIFICANT CHANGE UP (ref 135–145)
SP GR SPEC: 1.02 — SIGNIFICANT CHANGE UP (ref 1.01–1.02)
UROBILINOGEN FLD QL: NEGATIVE — SIGNIFICANT CHANGE UP
WBC # BLD: 10.21 K/UL — SIGNIFICANT CHANGE UP (ref 3.8–10.5)
WBC # FLD AUTO: 10.21 K/UL — SIGNIFICANT CHANGE UP (ref 3.8–10.5)
WBC UR QL: >50 /HPF — HIGH (ref 0–5)

## 2020-03-02 PROCEDURE — 99285 EMERGENCY DEPT VISIT HI MDM: CPT

## 2020-03-02 PROCEDURE — 93010 ELECTROCARDIOGRAM REPORT: CPT

## 2020-03-02 PROCEDURE — 70450 CT HEAD/BRAIN W/O DYE: CPT | Mod: 26

## 2020-03-02 RX ORDER — CEFTRIAXONE 500 MG/1
1000 INJECTION, POWDER, FOR SOLUTION INTRAMUSCULAR; INTRAVENOUS ONCE
Refills: 0 | Status: COMPLETED | OUTPATIENT
Start: 2020-03-02 | End: 2020-03-02

## 2020-03-02 RX ORDER — SODIUM CHLORIDE 9 MG/ML
1000 INJECTION INTRAMUSCULAR; INTRAVENOUS; SUBCUTANEOUS ONCE
Refills: 0 | Status: COMPLETED | OUTPATIENT
Start: 2020-03-02 | End: 2020-03-02

## 2020-03-02 RX ORDER — ACETAMINOPHEN 500 MG
650 TABLET ORAL ONCE
Refills: 0 | Status: COMPLETED | OUTPATIENT
Start: 2020-03-02 | End: 2020-03-02

## 2020-03-02 RX ADMIN — Medication 650 MILLIGRAM(S): at 21:32

## 2020-03-02 RX ADMIN — SODIUM CHLORIDE 1000 MILLILITER(S): 9 INJECTION INTRAMUSCULAR; INTRAVENOUS; SUBCUTANEOUS at 23:00

## 2020-03-02 RX ADMIN — SODIUM CHLORIDE 1000 MILLILITER(S): 9 INJECTION INTRAMUSCULAR; INTRAVENOUS; SUBCUTANEOUS at 21:32

## 2020-03-02 NOTE — ED ADULT NURSE REASSESSMENT NOTE - NS ED NURSE REASSESS COMMENT FT1
Patient straight cathed using sterile technique. Second RN at bedside to confirm sterility. Patient tolerated procedure well. Output of approximately 200 cc's of dark cloudy yellow urine. Sterile specimens collected and sent to lab. Pt tolerated well. Pt provided with rectal tylenol as well for fever rectally. Safety and comfort measures provided, bed locked and in lowest position, side rails up for safety. Call bell within reach. Awaiting all results. Family remains at bedside.

## 2020-03-02 NOTE — ED PROVIDER NOTE - CLINICAL SUMMARY MEDICAL DECISION MAKING FREE TEXT BOX
Pt p/w AMS and fever likely 2/2 confirmed UTI, pt very tachypneic upon arrival so placed on BIPAP, clungs clear b/l, tachypnea likely 2/2 fever so will consider trial off BIPAP once infx sx controlled, will obtain septic w/u and reassess, family would prefer pt not to be admitted and to return to nursing home on antibx, but pt metabolic status may dictate a higher level of care. Cont to reassess, fluids and antipyretics w/ antibx in meantime -Bailey

## 2020-03-02 NOTE — ED PROVIDER NOTE - OBJECTIVE STATEMENT
89y f PMhx uterine ca. (untx), HTN, DM, chronic b/l foot ulcers, DNR/DNI/DNH resident [facility] 89[facility] f PMhx uterine ca. (untx), HTN, DM, chronic b/l foot ulcers, [facility], recently diagnosed UTI, p/w AMS worsening over the last 24hrs. Pt usually A&Ox3, and now is not oriented to person, place, or time. Pt appears uncomfortable but otherwise is unable to provide history at this time.

## 2020-03-02 NOTE — ED ADULT NURSE NOTE - OBJECTIVE STATEMENT
89 y/o Female presenting [facility] ED [facility], A&Ox1 responsive to name, disoriented to place and time, brought in for evaluation and to be sent back to nursing home. Pt family member reports saturday she had a fall off of the bed, unwitnessed pt denies LOC or hitting her head. Family reports yesterday she had episode of vomiting, fever, SOB and shaking. Pt today requested to go to hospital, when senior care arrived pt was 82% on room air, on nonrebreather pt now 100%, responsive to voice. Bilateral feet wrapped due to diabetic ulcers on the feet, family requests these not to be unwrapped. Pt appears slightly pale and cyanotic on the lips. Pt hx of diabetes, uterine CA with no chemo/radiation. FS found to be 188, 3 IVs in place, 2 placed here one 24g placed by EMS, flushes and blood return present. Pt DNR, DNI and not to be hospitalized, MOLST form present and can be found in chart. Safety and comfort measures provided, bed locked and in lowest position, side rails up for safety. Call bell within reach. Awaiting further MD orders for RN interventions.

## 2020-03-02 NOTE — ED PROVIDER NOTE - ATTENDING CONTRIBUTION TO CARE
I have seen and evaluated this patient with the resident.   I agree with the findings  unless other wise stated.  I have made appropriate changes in documentations where needed, After my face to face bedside evaluation, I am further  notiny f PMhx uterine ca. (untx), HTN, DM, chronic b/l foot ulcers, [facility], recently diagnosed UTI, p/w AMS worsening over the last 24hrs. Pt usually A&Ox3, and now is not oriented to person, place, or time. Pt appears uncomfortable but otherwise is unable to provide history at this time. will admit for iv antibiotics and further care--Malone

## 2020-03-02 NOTE — ED PROVIDER NOTE - PROGRESS NOTE DETAILS
AV: received signout from Dr. Malone at the usual time. On reassessment, patient is more altered, hypoxic on RA, hypotensive. Discussed with family at bedside, advanced directives reviewed, decision made to hospitalize this patient as seems to be going into septic shock. Placed back on oxygen, started 2nd liter, will give pain meds, consider pressors if persistently hypotensive.
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
## 2020-03-03 DIAGNOSIS — J96.01 ACUTE RESPIRATORY FAILURE WITH HYPOXIA: ICD-10-CM

## 2020-03-03 DIAGNOSIS — N39.0 URINARY TRACT INFECTION, SITE NOT SPECIFIED: ICD-10-CM

## 2020-03-03 DIAGNOSIS — R53.81 OTHER MALAISE: ICD-10-CM

## 2020-03-03 DIAGNOSIS — D64.9 ANEMIA, UNSPECIFIED: ICD-10-CM

## 2020-03-03 DIAGNOSIS — E11.9 TYPE 2 DIABETES MELLITUS WITHOUT COMPLICATIONS: ICD-10-CM

## 2020-03-03 DIAGNOSIS — S81.809A UNSPECIFIED OPEN WOUND, UNSPECIFIED LOWER LEG, INITIAL ENCOUNTER: ICD-10-CM

## 2020-03-03 DIAGNOSIS — N17.9 ACUTE KIDNEY FAILURE, UNSPECIFIED: ICD-10-CM

## 2020-03-03 DIAGNOSIS — G93.41 METABOLIC ENCEPHALOPATHY: ICD-10-CM

## 2020-03-03 DIAGNOSIS — Z71.89 OTHER SPECIFIED COUNSELING: ICD-10-CM

## 2020-03-03 DIAGNOSIS — Z51.5 ENCOUNTER FOR PALLIATIVE CARE: ICD-10-CM

## 2020-03-03 DIAGNOSIS — R06.00 DYSPNEA, UNSPECIFIED: ICD-10-CM

## 2020-03-03 DIAGNOSIS — A41.9 SEPSIS, UNSPECIFIED ORGANISM: ICD-10-CM

## 2020-03-03 DIAGNOSIS — R41.82 ALTERED MENTAL STATUS, UNSPECIFIED: ICD-10-CM

## 2020-03-03 DIAGNOSIS — Z29.9 ENCOUNTER FOR PROPHYLACTIC MEASURES, UNSPECIFIED: ICD-10-CM

## 2020-03-03 DIAGNOSIS — G93.40 ENCEPHALOPATHY, UNSPECIFIED: ICD-10-CM

## 2020-03-03 DIAGNOSIS — I10 ESSENTIAL (PRIMARY) HYPERTENSION: ICD-10-CM

## 2020-03-03 DIAGNOSIS — R52 PAIN, UNSPECIFIED: ICD-10-CM

## 2020-03-03 LAB
ANION GAP SERPL CALC-SCNC: 14 MMOL/L — SIGNIFICANT CHANGE UP (ref 5–17)
BASOPHILS # BLD AUTO: 0 K/UL — SIGNIFICANT CHANGE UP (ref 0–0.2)
BASOPHILS # BLD AUTO: 0 K/UL — SIGNIFICANT CHANGE UP (ref 0–0.2)
BASOPHILS NFR BLD AUTO: 0 % — SIGNIFICANT CHANGE UP (ref 0–2)
BASOPHILS NFR BLD AUTO: 0 % — SIGNIFICANT CHANGE UP (ref 0–2)
BUN SERPL-MCNC: 67 MG/DL — HIGH (ref 7–23)
CALCIUM SERPL-MCNC: 8.1 MG/DL — LOW (ref 8.4–10.5)
CHLORIDE SERPL-SCNC: 106 MMOL/L — SIGNIFICANT CHANGE UP (ref 96–108)
CO2 SERPL-SCNC: 17 MMOL/L — LOW (ref 22–31)
CREAT SERPL-MCNC: 3.15 MG/DL — HIGH (ref 0.5–1.3)
CRP SERPL-MCNC: 36.68 MG/DL — HIGH (ref 0–0.4)
CULTURE RESULTS: NO GROWTH — SIGNIFICANT CHANGE UP
EOSINOPHIL # BLD AUTO: 0 K/UL — SIGNIFICANT CHANGE UP (ref 0–0.5)
EOSINOPHIL # BLD AUTO: 0.21 K/UL — SIGNIFICANT CHANGE UP (ref 0–0.5)
EOSINOPHIL NFR BLD AUTO: 0 % — SIGNIFICANT CHANGE UP (ref 0–6)
EOSINOPHIL NFR BLD AUTO: 1.8 % — SIGNIFICANT CHANGE UP (ref 0–6)
ERYTHROCYTE [SEDIMENTATION RATE] IN BLOOD: 91 MM/HR — HIGH (ref 0–20)
GLUCOSE BLDC GLUCOMTR-MCNC: 103 MG/DL — HIGH (ref 70–99)
GLUCOSE BLDC GLUCOMTR-MCNC: 72 MG/DL — SIGNIFICANT CHANGE UP (ref 70–99)
GLUCOSE BLDC GLUCOMTR-MCNC: 82 MG/DL — SIGNIFICANT CHANGE UP (ref 70–99)
GLUCOSE BLDC GLUCOMTR-MCNC: 87 MG/DL — SIGNIFICANT CHANGE UP (ref 70–99)
GLUCOSE SERPL-MCNC: 99 MG/DL — SIGNIFICANT CHANGE UP (ref 70–99)
HBA1C BLD-MCNC: 8.3 % — HIGH (ref 4–5.6)
HCT VFR BLD CALC: 30.3 % — LOW (ref 34.5–45)
HGB BLD-MCNC: 9.2 G/DL — LOW (ref 11.5–15.5)
LYMPHOCYTES # BLD AUTO: 0.27 K/UL — LOW (ref 1–3.3)
LYMPHOCYTES # BLD AUTO: 0.59 K/UL — LOW (ref 1–3.3)
LYMPHOCYTES # BLD AUTO: 2.6 % — LOW (ref 13–44)
LYMPHOCYTES # BLD AUTO: 5.2 % — LOW (ref 13–44)
MAGNESIUM SERPL-MCNC: 1.7 MG/DL — SIGNIFICANT CHANGE UP (ref 1.6–2.6)
MCHC RBC-ENTMCNC: 26.1 PG — LOW (ref 27–34)
MCHC RBC-ENTMCNC: 30.4 GM/DL — LOW (ref 32–36)
MCV RBC AUTO: 86.1 FL — SIGNIFICANT CHANGE UP (ref 80–100)
MONOCYTES # BLD AUTO: 0 K/UL — SIGNIFICANT CHANGE UP (ref 0–0.9)
MONOCYTES # BLD AUTO: 0 K/UL — SIGNIFICANT CHANGE UP (ref 0–0.9)
MONOCYTES NFR BLD AUTO: 0 % — LOW (ref 2–14)
MONOCYTES NFR BLD AUTO: 0 % — LOW (ref 2–14)
NEUTROPHILS # BLD AUTO: 10.59 K/UL — HIGH (ref 1.8–7.4)
NEUTROPHILS # BLD AUTO: 9.76 K/UL — HIGH (ref 1.8–7.4)
NEUTROPHILS NFR BLD AUTO: 87.8 % — HIGH (ref 43–77)
NEUTROPHILS NFR BLD AUTO: 88.6 % — HIGH (ref 43–77)
PHOSPHATE SERPL-MCNC: 3.3 MG/DL — SIGNIFICANT CHANGE UP (ref 2.5–4.5)
PLATELET # BLD AUTO: 147 K/UL — LOW (ref 150–400)
POTASSIUM SERPL-MCNC: 4.3 MMOL/L — SIGNIFICANT CHANGE UP (ref 3.5–5.3)
POTASSIUM SERPL-SCNC: 4.3 MMOL/L — SIGNIFICANT CHANGE UP (ref 3.5–5.3)
RAPID RVP RESULT: SIGNIFICANT CHANGE UP
RBC # BLD: 3.52 M/UL — LOW (ref 3.8–5.2)
RBC # FLD: 17.6 % — HIGH (ref 10.3–14.5)
SODIUM SERPL-SCNC: 137 MMOL/L — SIGNIFICANT CHANGE UP (ref 135–145)
SPECIMEN SOURCE: SIGNIFICANT CHANGE UP
WBC # BLD: 11.39 K/UL — HIGH (ref 3.8–10.5)
WBC # FLD AUTO: 11.39 K/UL — HIGH (ref 3.8–10.5)

## 2020-03-03 PROCEDURE — 99223 1ST HOSP IP/OBS HIGH 75: CPT

## 2020-03-03 PROCEDURE — 71045 X-RAY EXAM CHEST 1 VIEW: CPT | Mod: 26

## 2020-03-03 PROCEDURE — 12345: CPT | Mod: NC

## 2020-03-03 PROCEDURE — 99223 1ST HOSP IP/OBS HIGH 75: CPT | Mod: GC

## 2020-03-03 PROCEDURE — 73630 X-RAY EXAM OF FOOT: CPT | Mod: 26,50

## 2020-03-03 PROCEDURE — 99497 ADVNCD CARE PLAN 30 MIN: CPT | Mod: 25,GC

## 2020-03-03 PROCEDURE — 73610 X-RAY EXAM OF ANKLE: CPT | Mod: 26,RT

## 2020-03-03 RX ORDER — OMEPRAZOLE 10 MG/1
1 CAPSULE, DELAYED RELEASE ORAL
Qty: 0 | Refills: 0 | DISCHARGE

## 2020-03-03 RX ORDER — DEXTROSE 50 % IN WATER 50 %
25 SYRINGE (ML) INTRAVENOUS ONCE
Refills: 0 | Status: DISCONTINUED | OUTPATIENT
Start: 2020-03-03 | End: 2020-03-03

## 2020-03-03 RX ORDER — MIRTAZAPINE 45 MG/1
1 TABLET, ORALLY DISINTEGRATING ORAL
Qty: 0 | Refills: 0 | DISCHARGE

## 2020-03-03 RX ORDER — PIPERACILLIN AND TAZOBACTAM 4; .5 G/20ML; G/20ML
3.38 INJECTION, POWDER, LYOPHILIZED, FOR SOLUTION INTRAVENOUS ONCE
Refills: 0 | Status: COMPLETED | OUTPATIENT
Start: 2020-03-03 | End: 2020-03-03

## 2020-03-03 RX ORDER — KETOROLAC TROMETHAMINE 30 MG/ML
15 SYRINGE (ML) INJECTION ONCE
Refills: 0 | Status: DISCONTINUED | OUTPATIENT
Start: 2020-03-03 | End: 2020-03-03

## 2020-03-03 RX ORDER — SODIUM CHLORIDE 9 MG/ML
1000 INJECTION, SOLUTION INTRAVENOUS ONCE
Refills: 0 | Status: COMPLETED | OUTPATIENT
Start: 2020-03-03 | End: 2020-03-03

## 2020-03-03 RX ORDER — DEXTROSE 50 % IN WATER 50 %
12.5 SYRINGE (ML) INTRAVENOUS ONCE
Refills: 0 | Status: DISCONTINUED | OUTPATIENT
Start: 2020-03-03 | End: 2020-03-03

## 2020-03-03 RX ORDER — SODIUM CHLORIDE 9 MG/ML
1000 INJECTION, SOLUTION INTRAVENOUS
Refills: 0 | Status: DISCONTINUED | OUTPATIENT
Start: 2020-03-03 | End: 2020-03-06

## 2020-03-03 RX ORDER — RAMIPRIL 5 MG
1 CAPSULE ORAL
Qty: 0 | Refills: 0 | DISCHARGE

## 2020-03-03 RX ORDER — OXYCODONE HYDROCHLORIDE 5 MG/1
1 TABLET ORAL
Qty: 0 | Refills: 0 | DISCHARGE

## 2020-03-03 RX ORDER — MIRTAZAPINE 45 MG/1
7.5 TABLET, ORALLY DISINTEGRATING ORAL AT BEDTIME
Refills: 0 | Status: DISCONTINUED | OUTPATIENT
Start: 2020-03-03 | End: 2020-03-06

## 2020-03-03 RX ORDER — OXYCODONE HYDROCHLORIDE 5 MG/1
5 TABLET ORAL EVERY 8 HOURS
Refills: 0 | Status: DISCONTINUED | OUTPATIENT
Start: 2020-03-03 | End: 2020-03-03

## 2020-03-03 RX ORDER — SODIUM CHLORIDE 9 MG/ML
500 INJECTION INTRAMUSCULAR; INTRAVENOUS; SUBCUTANEOUS ONCE
Refills: 0 | Status: COMPLETED | OUTPATIENT
Start: 2020-03-03 | End: 2020-03-03

## 2020-03-03 RX ORDER — GABAPENTIN 400 MG/1
1 CAPSULE ORAL
Qty: 0 | Refills: 0 | DISCHARGE

## 2020-03-03 RX ORDER — LANOLIN ALCOHOL/MO/W.PET/CERES
5 CREAM (GRAM) TOPICAL AT BEDTIME
Refills: 0 | Status: DISCONTINUED | OUTPATIENT
Start: 2020-03-03 | End: 2020-03-06

## 2020-03-03 RX ORDER — MORPHINE SULFATE 50 MG/1
5 CAPSULE, EXTENDED RELEASE ORAL EVERY 6 HOURS
Refills: 0 | Status: DISCONTINUED | OUTPATIENT
Start: 2020-03-03 | End: 2020-03-04

## 2020-03-03 RX ORDER — GABAPENTIN 400 MG/1
600 CAPSULE ORAL
Refills: 0 | Status: DISCONTINUED | OUTPATIENT
Start: 2020-03-03 | End: 2020-03-03

## 2020-03-03 RX ORDER — HEPARIN SODIUM 5000 [USP'U]/ML
5000 INJECTION INTRAVENOUS; SUBCUTANEOUS EVERY 8 HOURS
Refills: 0 | Status: DISCONTINUED | OUTPATIENT
Start: 2020-03-03 | End: 2020-03-06

## 2020-03-03 RX ORDER — IBUPROFEN 200 MG
1 TABLET ORAL
Qty: 0 | Refills: 0 | DISCHARGE

## 2020-03-03 RX ORDER — COLLAGENASE CLOSTRIDIUM HIST. 250 UNIT/G
1 OINTMENT (GRAM) TOPICAL DAILY
Refills: 0 | Status: DISCONTINUED | OUTPATIENT
Start: 2020-03-03 | End: 2020-03-06

## 2020-03-03 RX ORDER — PANTOPRAZOLE SODIUM 20 MG/1
40 TABLET, DELAYED RELEASE ORAL
Refills: 0 | Status: DISCONTINUED | OUTPATIENT
Start: 2020-03-03 | End: 2020-03-06

## 2020-03-03 RX ORDER — SODIUM CHLORIDE 9 MG/ML
1000 INJECTION INTRAMUSCULAR; INTRAVENOUS; SUBCUTANEOUS
Refills: 0 | Status: DISCONTINUED | OUTPATIENT
Start: 2020-03-03 | End: 2020-03-03

## 2020-03-03 RX ORDER — DEXTROSE 50 % IN WATER 50 %
15 SYRINGE (ML) INTRAVENOUS ONCE
Refills: 0 | Status: DISCONTINUED | OUTPATIENT
Start: 2020-03-03 | End: 2020-03-03

## 2020-03-03 RX ORDER — INSULIN LISPRO 100/ML
VIAL (ML) SUBCUTANEOUS AT BEDTIME
Refills: 0 | Status: DISCONTINUED | OUTPATIENT
Start: 2020-03-03 | End: 2020-03-03

## 2020-03-03 RX ORDER — SODIUM CHLORIDE 9 MG/ML
1000 INJECTION, SOLUTION INTRAVENOUS
Refills: 0 | Status: DISCONTINUED | OUTPATIENT
Start: 2020-03-03 | End: 2020-03-03

## 2020-03-03 RX ORDER — PIPERACILLIN AND TAZOBACTAM 4; .5 G/20ML; G/20ML
3.38 INJECTION, POWDER, LYOPHILIZED, FOR SOLUTION INTRAVENOUS EVERY 12 HOURS
Refills: 0 | Status: DISCONTINUED | OUTPATIENT
Start: 2020-03-03 | End: 2020-03-06

## 2020-03-03 RX ORDER — SODIUM HYPOCHLORITE 0.125 %
1 SOLUTION, NON-ORAL MISCELLANEOUS DAILY
Refills: 0 | Status: DISCONTINUED | OUTPATIENT
Start: 2020-03-03 | End: 2020-03-06

## 2020-03-03 RX ORDER — CEFTRIAXONE 500 MG/1
1000 INJECTION, POWDER, FOR SOLUTION INTRAMUSCULAR; INTRAVENOUS EVERY 24 HOURS
Refills: 0 | Status: DISCONTINUED | OUTPATIENT
Start: 2020-03-03 | End: 2020-03-03

## 2020-03-03 RX ORDER — INSULIN LISPRO 100/ML
VIAL (ML) SUBCUTANEOUS
Refills: 0 | Status: DISCONTINUED | OUTPATIENT
Start: 2020-03-03 | End: 2020-03-04

## 2020-03-03 RX ORDER — GABAPENTIN 400 MG/1
400 CAPSULE ORAL AT BEDTIME
Refills: 0 | Status: DISCONTINUED | OUTPATIENT
Start: 2020-03-03 | End: 2020-03-06

## 2020-03-03 RX ORDER — INSULIN GLARGINE 100 [IU]/ML
32 INJECTION, SOLUTION SUBCUTANEOUS
Qty: 0 | Refills: 0 | DISCHARGE

## 2020-03-03 RX ORDER — LANOLIN ALCOHOL/MO/W.PET/CERES
2 CREAM (GRAM) TOPICAL
Qty: 0 | Refills: 0 | DISCHARGE

## 2020-03-03 RX ORDER — GLUCAGON INJECTION, SOLUTION 0.5 MG/.1ML
1 INJECTION, SOLUTION SUBCUTANEOUS ONCE
Refills: 0 | Status: DISCONTINUED | OUTPATIENT
Start: 2020-03-03 | End: 2020-03-03

## 2020-03-03 RX ORDER — ACETAMINOPHEN 500 MG
650 TABLET ORAL EVERY 6 HOURS
Refills: 0 | Status: DISCONTINUED | OUTPATIENT
Start: 2020-03-03 | End: 2020-03-06

## 2020-03-03 RX ORDER — METOPROLOL TARTRATE 50 MG
1 TABLET ORAL
Qty: 0 | Refills: 0 | DISCHARGE

## 2020-03-03 RX ADMIN — HEPARIN SODIUM 5000 UNIT(S): 5000 INJECTION INTRAVENOUS; SUBCUTANEOUS at 13:34

## 2020-03-03 RX ADMIN — HEPARIN SODIUM 5000 UNIT(S): 5000 INJECTION INTRAVENOUS; SUBCUTANEOUS at 22:42

## 2020-03-03 RX ADMIN — Medication 650 MILLIGRAM(S): at 17:09

## 2020-03-03 RX ADMIN — Medication 650 MILLIGRAM(S): at 18:00

## 2020-03-03 RX ADMIN — SODIUM CHLORIDE 500 MILLILITER(S): 9 INJECTION INTRAMUSCULAR; INTRAVENOUS; SUBCUTANEOUS at 17:09

## 2020-03-03 RX ADMIN — CEFTRIAXONE 100 MILLIGRAM(S): 500 INJECTION, POWDER, FOR SOLUTION INTRAMUSCULAR; INTRAVENOUS at 05:55

## 2020-03-03 RX ADMIN — SODIUM CHLORIDE 125 MILLILITER(S): 9 INJECTION INTRAMUSCULAR; INTRAVENOUS; SUBCUTANEOUS at 09:36

## 2020-03-03 RX ADMIN — HEPARIN SODIUM 5000 UNIT(S): 5000 INJECTION INTRAVENOUS; SUBCUTANEOUS at 05:55

## 2020-03-03 RX ADMIN — SODIUM CHLORIDE 75 MILLILITER(S): 9 INJECTION, SOLUTION INTRAVENOUS at 19:00

## 2020-03-03 RX ADMIN — PANTOPRAZOLE SODIUM 40 MILLIGRAM(S): 20 TABLET, DELAYED RELEASE ORAL at 05:55

## 2020-03-03 RX ADMIN — CEFTRIAXONE 1000 MILLIGRAM(S): 500 INJECTION, POWDER, FOR SOLUTION INTRAMUSCULAR; INTRAVENOUS at 01:00

## 2020-03-03 RX ADMIN — SODIUM CHLORIDE 75 MILLILITER(S): 9 INJECTION INTRAMUSCULAR; INTRAVENOUS; SUBCUTANEOUS at 05:36

## 2020-03-03 RX ADMIN — Medication 650 MILLIGRAM(S): at 00:03

## 2020-03-03 RX ADMIN — SODIUM CHLORIDE 1000 MILLILITER(S): 9 INJECTION, SOLUTION INTRAVENOUS at 01:36

## 2020-03-03 RX ADMIN — Medication 15 MILLIGRAM(S): at 01:36

## 2020-03-03 RX ADMIN — PIPERACILLIN AND TAZOBACTAM 200 GRAM(S): 4; .5 INJECTION, POWDER, LYOPHILIZED, FOR SOLUTION INTRAVENOUS at 18:59

## 2020-03-03 RX ADMIN — Medication 5 MILLIGRAM(S): at 05:55

## 2020-03-03 RX ADMIN — CEFTRIAXONE 100 MILLIGRAM(S): 500 INJECTION, POWDER, FOR SOLUTION INTRAMUSCULAR; INTRAVENOUS at 00:03

## 2020-03-03 RX ADMIN — GABAPENTIN 600 MILLIGRAM(S): 400 CAPSULE ORAL at 05:55

## 2020-03-03 NOTE — H&P ADULT - NSICDXPASTMEDICALHX_GEN_ALL_CORE_FT
PAST MEDICAL HISTORY:  Benign Hypertension     Breast Cancer at 48 yo and 38 yo, bilateral mastectomy    CAD (coronary artery disease)     CVA (Cerebral Infarction) 1997 - no residual symptoms    Diabetes Mellitus     History of Obesity     HTN (hypertension)

## 2020-03-03 NOTE — GOALS OF CARE CONVERSATION - ADVANCED CARE PLANNING - CONVERSATION DETAILS
Reviewed patient's medical problems and current workup. Discussed potential trajectory and outcome, i.e. potential recovery from acute infection vs continued decline. Discussed planned and potential treatments options related to UTI, Foot Wounds, Renal Failure, and Hypoxia. HCP is amenable to "reasonable" medical care, no aggressive/invasive measures. Accepting of Abx and IVF. Would not pursue surgical interventions or workup of hypoxia.    If patient recovers mental status and is able to be discharged, discussed benefit of Hospice services at NH. They are understanding and accepting of referral when appropriate. Reviewed patient's medical problems and current workup. Discussed potential trajectory and outcome, i.e. potential recovery from acute infection vs continued decline. Discussed planned and potential treatments options related to UTI, Foot Wounds, Renal Failure, and Hypoxia. HCP is amenable to "reasonable" medical care, no aggressive/invasive measures. Accepting of Abx and IVF. Would not pursue surgical interventions or workup of hypoxia.    If patient recovers mental status and is able to be discharged, discussed benefit of Hospice services at NH. They are understanding and accepting of referral when appropriate.,

## 2020-03-03 NOTE — H&P ADULT - PROBLEM SELECTOR PLAN 1
The patient presents with new-onset confusion and disorientation for one day in the setting of infection and recent fall. A&Ox1. Baseline is A&Ox3. CT head negative for acute pathology.  - likely 2/2 sepsis and urinary tract infection  - treat UTI  - re-orientation  - fall precautions  - continue to monitor. The patient presents with new-onset confusion and disorientation for one day in the setting of infection and recent fall. currently A&Ox1. Baseline is A&Ox3. CT head negative for acute pathology.  - likely 2/2 sepsis and urinary tract infection and possible uremia in setting of MIRIAM  - treat UTI  - re-orientation  - fall precautions  - continue to monitor

## 2020-03-03 NOTE — PHYSICAL THERAPY INITIAL EVALUATION ADULT - PERTINENT HX OF CURRENT PROBLEM, REHAB EVAL
Pt is a 89 year old female with PMH uterine cancer, HTN, T2DM, and chronic b/l foot ulcers who presents from [facility] with AMS (signs of confusion and disorientation). She was also hard to arouse. The patient also experienced a fall yesterday. She was found next to her bed and the cause of the fall was unknown. Stat labs were drawn and she was found with WBC 16 and a positive urinalysis. She also experienced a fever and hypoxia to the 80s. (-) Head CT 3/2/2020. (-) CXR 3/3/2020.

## 2020-03-03 NOTE — PHYSICAL THERAPY INITIAL EVALUATION ADULT - MODALITIES TREATMENT COMMENTS
Patient with multiple full thickness ulcerations including Rt lateral heel (malodorous), Rt medial malleolus, left great toe (malodorous). Also has intact unstageable eschar posterior lateral left heel and numerous scattered areas of DTI callie feet/lower leg

## 2020-03-03 NOTE — H&P ADULT - PROBLEM SELECTOR PLAN 8
- holding home enalapril and metoprolol succinate in setting of sepsis - holding home enalapril and metoprolol succinate in setting of sepsis and MIRIAM  - monitor BP closely

## 2020-03-03 NOTE — H&P ADULT - PROBLEM SELECTOR PLAN 2
Patient presented with fever of 102.3, , and RR 28. She received 2L IVF bolus total in ED and 1g ceftriaxone.   - likely 2/2 urinary tract infection  - continue with IV antibiotics  - blood pressure stable, will hold off on further IVFs for now  - follow up BCX and UCX  - trend CBC daily and monitor vitals Patient presented with fever of 102.3, , and RR 28. She received 2L IVF bolus total in ED and 1g ceftriaxone.   - likely 2/2 urinary tract infection  - continue with IV antibiotics  - check RVP  - follow up BCX and UCX  - trend CBC daily and monitor vitals Patient presented with fever of 102.3, , and RR 28. She received 2L IVF bolus total in ED and 1g ceftriaxone.   - likely 2/2 urinary tract infection  - continue with IV antibiotics  - check RVP  - follow up BCX and UCX  - trend CBC daily and monitor vitals closely

## 2020-03-03 NOTE — CONSULT NOTE ADULT - SUBJECTIVE AND OBJECTIVE BOX
Podiatry pager #: [facility] [phone]/ [phone] [phone]    Patient is a 89y old  Female who presents with a chief complaint of Altered Mental Status (03 Mar 2020 15:10)      HPI:  89 year old female with PMH uterine cancer, HTN, T2DM, and chronic b/l foot ulcers who presents from [facility] with AMS. Patient poor historian, history obtained from nursing home staff. Baseline mental status is A&Ox3. Yesterday at 4:40 pm the patient started to exhibit signs of confusion and disorientation. She was also hard to arouse. She continued to be confused throughout the evening. She did not complain of any specific symptoms. The patient also experienced a fall yesterday. She was found next to her bed and the cause of the fall was unknown. Stat labs were drawn and she was found with WBC 16 and a positive urinalysis. She also experienced a fever and hypoxia to the 80s. She was started on zosyn.  Her family member was notified and they wanted her to be sent to [facility] ED.    In the ED, vitals were T102.3, -112, BP /, RR 20-28, and 88% requiring NRB, BiPAP and eventually transitioned to nasal cannula. She received 1L LR bolus x1, 1L NS bolus x1, ceftriaxone 1g x1, toradol 15 mg x1, 650 mg acetaminophen suppository.   BCX and UCX were sent. (03 Mar 2020 03:53)      PAST MEDICAL & SURGICAL HISTORY:  HTN (hypertension)  CAD (coronary artery disease)  Breast Cancer: at 48 yo and 40 yo, bilateral mastectomy  CVA (Cerebral Infarction): 1997 - no residual symptoms  History of Obesity  Diabetes Mellitus  Benign Hypertension  Fracture of shaft of tibia and fibula: right  S/P PTCA (percutaneous transluminal coronary angioplasty)  Ankle Injury: left ankle surgery  History of Mastectomy: 1969 and 1979      MEDICATIONS  (STANDING):  cefTRIAXone   IVPB 1000 milliGRAM(s) IV Intermittent every 24 hours  collagenase Ointment 1 Application(s) Topical daily  Dakins Solution - 1/4 Strength 1 Application(s) Topical daily  dextrose 5% + sodium chloride 0.9%. 1000 milliLiter(s) (75 mL/Hr) IV Continuous <Continuous>  dextrose 5%. 1000 milliLiter(s) (50 mL/Hr) IV Continuous <Continuous>  dextrose 50% Injectable 12.5 Gram(s) IV Push once  dextrose 50% Injectable 25 Gram(s) IV Push once  dextrose 50% Injectable 25 Gram(s) IV Push once  gabapentin 400 milliGRAM(s) Oral at bedtime  heparin  Injectable 5000 Unit(s) SubCutaneous every 8 hours  insulin lispro (HumaLOG) corrective regimen sliding scale   SubCutaneous three times a day before meals  insulin lispro (HumaLOG) corrective regimen sliding scale   SubCutaneous at bedtime  melatonin 5 milliGRAM(s) Oral at bedtime  mirtazapine 7.5 milliGRAM(s) Oral at bedtime  pantoprazole    Tablet 40 milliGRAM(s) Oral before breakfast  predniSONE   Tablet 5 milliGRAM(s) Oral daily    MEDICATIONS  (PRN):  dextrose 40% Gel 15 Gram(s) Oral once PRN Blood Glucose LESS THAN 70 milliGRAM(s)/deciliter  glucagon  Injectable 1 milliGRAM(s) IntraMuscular once PRN Glucose LESS THAN 70 milligrams/deciliter      Allergies    No Known Allergies    Intolerances        VITALS:    Vital Signs Last 24 Hrs  T(C): 36.7 (03 Mar 2020 08:49), Max: 39.1 (02 Mar 2020 21:17)  T(F): 98.1 (03 Mar 2020 08:49), Max: 102.3 (02 Mar 2020 21:17)  HR: 89 (03 Mar 2020 08:49) (89 - 124)  BP: 93/60 (03 Mar 2020 08:49) (92/82 - 177/142)  BP(mean): --  RR: 18 (03 Mar 2020 08:49) (18 - 28)  SpO2: 97% (03 Mar 2020 08:49) (88% - 100%)    LABS:                          9.2    11.39 )-----------( 147      ( 03 Mar 2020 04:48 )             30.3       03-03    137  |  106  |  67<H>  ----------------------------<  99  4.3   |  17<L>  |  3.15<H>    Ca    8.1<L>      03 Mar 2020 04:48  Phos  3.3     03-03  Mg     1.7     03-03    TPro  6.0  /  Alb  2.4<L>  /  TBili  0.4  /  DBili  x   /  AST  19  /  ALT  9<L>  /  AlkPhos  73  03-02      CAPILLARY BLOOD GLUCOSE      POCT Blood Glucose.: 72 mg/dL (03 Mar 2020 13:29)  POCT Blood Glucose.: 87 mg/dL (03 Mar 2020 09:23)  POCT Blood Glucose.: 188 mg/dL (02 Mar 2020 21:15)          LOWER EXTREMITY PHYSICAL EXAM:    Vasular: DP/PT 1/4, B/L, CFT <5 seconds B/L, Temperature gradient normal, B/L.   Neuro: Epicritic sensation diminished to the level of digits, B/L.  Musculoskeletal/Ortho: everted ankle bl, loss of muscle strength to bl LEs   Skin:  Right LE - right medial ankle wound to subQ, 2x2x0.2cm, no signs of infection with granular base. Right lateral heel wound to bone with fibrotic tissue and malodor, [id number].   Left LE - left hallux wound to bone, [id number], with fibrotic tissue and malodor, IP joint is exposed with necrotic bone. Left heel stable, dry eschar intact, 5x5 full thickness.     RADIOLOGY & ADDITIONAL STUDIES: Podiatry pager #: [facility] [phone]/ [phone]    Patient is a 89y old  Female who presents with a chief complaint of Altered Mental Status (03 Mar 2020 15:10)      HPI:  89 year old female with PMH uterine cancer, HTN, T2DM, and chronic b/l foot ulcers who presents from [facility] with AMS. Patient poor historian, history obtained from nursing home staff. Baseline mental status is A&Ox3. Yesterday at 4:40 pm the patient started to exhibit signs of confusion and disorientation. She was also hard to arouse. She continued to be confused throughout the evening. She did not complain of any specific symptoms. The patient also experienced a fall yesterday. She was found next to her bed and the cause of the fall was unknown. Stat labs were drawn and she was found with WBC 16 and a positive urinalysis. She also experienced a fever and hypoxia to the 80s. She was started on zosyn.  Her family member was notified and they wanted her to be sent to [facility] ED.    In the ED, vitals were T102.3, -112, BP /, RR 20-28, and 88% requiring NRB, BiPAP and eventually transitioned to nasal cannula. She received 1L LR bolus x1, 1L NS bolus x1, ceftriaxone 1g x1, toradol 15 mg x1, 650 mg acetaminophen suppository.   BCX and UCX were sent. (03 Mar 2020 03:53)      PAST MEDICAL & SURGICAL HISTORY:  HTN (hypertension)  CAD (coronary artery disease)  Breast Cancer: at 48 yo and 40 yo, bilateral mastectomy  CVA (Cerebral Infarction): 1997 - no residual symptoms  History of Obesity  Diabetes Mellitus  Benign Hypertension  Fracture of shaft of tibia and fibula: right  S/P PTCA (percutaneous transluminal coronary angioplasty)  Ankle Injury: left ankle surgery  History of Mastectomy: 1969 and 1979      MEDICATIONS  (STANDING):  cefTRIAXone   IVPB 1000 milliGRAM(s) IV Intermittent every 24 hours  collagenase Ointment 1 Application(s) Topical daily  Dakins Solution - 1/4 Strength 1 Application(s) Topical daily  dextrose 5% + sodium chloride 0.9%. 1000 milliLiter(s) (75 mL/Hr) IV Continuous <Continuous>  dextrose 5%. 1000 milliLiter(s) (50 mL/Hr) IV Continuous <Continuous>  dextrose 50% Injectable 12.5 Gram(s) IV Push once  dextrose 50% Injectable 25 Gram(s) IV Push once  dextrose 50% Injectable 25 Gram(s) IV Push once  gabapentin 400 milliGRAM(s) Oral at bedtime  heparin  Injectable 5000 Unit(s) SubCutaneous every 8 hours  insulin lispro (HumaLOG) corrective regimen sliding scale   SubCutaneous three times a day before meals  insulin lispro (HumaLOG) corrective regimen sliding scale   SubCutaneous at bedtime  melatonin 5 milliGRAM(s) Oral at bedtime  mirtazapine 7.5 milliGRAM(s) Oral at bedtime  pantoprazole    Tablet 40 milliGRAM(s) Oral before breakfast  predniSONE   Tablet 5 milliGRAM(s) Oral daily    MEDICATIONS  (PRN):  dextrose 40% Gel 15 Gram(s) Oral once PRN Blood Glucose LESS THAN 70 milliGRAM(s)/deciliter  glucagon  Injectable 1 milliGRAM(s) IntraMuscular once PRN Glucose LESS THAN 70 milligrams/deciliter      Allergies    No Known Allergies    Intolerances        VITALS:    Vital Signs Last 24 Hrs  T(C): 36.7 (03 Mar 2020 08:49), Max: 39.1 (02 Mar 2020 21:17)  T(F): 98.1 (03 Mar 2020 08:49), Max: 102.3 (02 Mar 2020 21:17)  HR: 89 (03 Mar 2020 08:49) (89 - 124)  BP: 93/60 (03 Mar 2020 08:49) (92/82 - 177/142)  BP(mean): --  RR: 18 (03 Mar 2020 08:49) (18 - 28)  SpO2: 97% (03 Mar 2020 08:49) (88% - 100%)    LABS:                          9.2    11.39 )-----------( 147      ( 03 Mar 2020 04:48 )             30.3       03-03    137  |  106  |  67<H>  ----------------------------<  99  4.3   |  17<L>  |  3.15<H>    Ca    8.1<L>      03 Mar 2020 04:48  Phos  3.3     03-03  Mg     1.7     03-03    TPro  6.0  /  Alb  2.4<L>  /  TBili  0.4  /  DBili  x   /  AST  19  /  ALT  9<L>  /  AlkPhos  73  03-02      CAPILLARY BLOOD GLUCOSE      POCT Blood Glucose.: 72 mg/dL (03 Mar 2020 13:29)  POCT Blood Glucose.: 87 mg/dL (03 Mar 2020 09:23)  POCT Blood Glucose.: 188 mg/dL (02 Mar 2020 21:15)          LOWER EXTREMITY PHYSICAL EXAM:    Vasular: DP/PT 1/4, B/L, CFT <5 seconds B/L, Temperature gradient normal, B/L.   Neuro: Epicritic sensation diminished to the level of digits, B/L.  Musculoskeletal/Ortho: inverted ankle bl, loss of muscle strength to bl LEs   Skin:  Right LE - right medial ankle wound to subQ, 2x2x0.2cm, no signs of infection with granular base. Right lateral heel wound to bone with fibrotic tissue and malodor, [id number].   Left LE - left hallux wound to bone, [id number], with fibrotic tissue and malodor, IP joint is exposed with necrotic bone. 2nd toe wound to subQ, 2.5x2x0.2cm. Distal 4th toe eschar. Left heel stable, dry eschar intact, 5x5 full thickness.     RADIOLOGY & ADDITIONAL STUDIES:

## 2020-03-03 NOTE — PATIENT PROFILE ADULT - NSPROPTRIGHTCAREGIVER_GEN_A_NUR
no
Gen: intoxicated, slurred speech, smells of ETOH, falls asleep during conversation, repeats questions, AOx2- person/place  Head: NCAT  HEENT: PERRL, EOMI, oral mucosa moist, normal conjunctiva, neck supple  Lung: CTAB, no respiratory distress  CV: rrr, no murmur, Normal perfusion  Abd: soft, NTND  MSK: No edema, no visible deformities  Neuro: No focal neurologic deficits, 5/5 globnal strength, sensation intact  Skin: No rash

## 2020-03-03 NOTE — CONSULT NOTE ADULT - PROBLEM SELECTOR RECOMMENDATION 3
In the setting of sepsis, UTI, renal failure, +/- LE wound infection  -CT Head w/o acute pathology  -treatment/workup of infections as below  -c/w supportive care In the setting of UTI, renal failure, +/- LE wound infection  -CT Head w/o acute pathology  -treatment/workup of infections as below  -c/w supportive care

## 2020-03-03 NOTE — H&P ADULT - NSICDXPASTSURGICALHX_GEN_ALL_CORE_FT
PAST SURGICAL HISTORY:  Ankle Injury left ankle surgery    Fracture of shaft of tibia and fibula right    History of Mastectomy 1969 and 1979    S/P PTCA (percutaneous transluminal coronary angioplasty)

## 2020-03-03 NOTE — H&P ADULT - NSHPLABSRESULTS_GEN_ALL_CORE
LABS:                          9.0    10.21 )-----------( 164      ( 02 Mar 2020 22:37 )             30.6     Hb Trend: 9.0<--  WBC Trend: 10.21<--  Plt Trend: 164<--          03    143  |  104  |  66<H>  ----------------------------<  181<H>  4.3   |  22  |  3.01<H>    Ca    8.3<L>      02 Mar 2020 22:37    TPro  6.0  /  Alb  2.4<L>  /  TBili  0.4  /  DBili  x   /  AST  19  /  ALT  9<L>  /  AlkPhos  73          Urinalysis Basic - ( 02 Mar 2020 21:55 )    Color: Dark Yellow / Appearance: Turbid / S.025 / pH: x  Gluc: x / Ketone: Negative  / Bili: Negative / Urobili: Negative   Blood: x / Protein: 100 / Nitrite: Negative   Leuk Esterase: Large / RBC: >50 /hpf / WBC >50 /HPF   Sq Epi: x / Non Sq Epi: 5 / Bacteria: TNTC      CAPILLARY BLOOD GLUCOSE      POCT Blood Glucose.: 188 mg/dL (02 Mar 2020 21:15)        IMAGING:      EXAM:  XR CHEST PORTABLE URGENT 1V                        PROCEDURE DATE:  2020      ******PRELIMINARY REPORT******    ******PRELIMINARY REPORT******        INTERPRETATION:  no emergent findings    EXAM:  CT BRAIN                        PROCEDURE DATE:  2020     IMPRESSION:    No acute intracranial hemorrhage, territorial infarct, mass effect or displaced calvarial fracture. Labs, imaging and EKG personally reviewed and interpreted by me. EKG sinus tachycardia 100s, no acute ischemic changes.                             9.0    10.21 )-----------( 164      ( 02 Mar 2020 22:37 )             30.6     Hb Trend: 9.0<--  WBC Trend: 10.21<--  Plt Trend: 164<--      03-    143  |  104  |  66<H>  ----------------------------<  181<H>  4.3   |  22  |  3.01<H>    Ca    8.3<L>      02 Mar 2020 22:37    TPro  6.0  /  Alb  2.4<L>  /  TBili  0.4  /  DBili  x   /  AST  19  /  ALT  9<L>  /  AlkPhos  73  02      Urinalysis Basic - ( 02 Mar 2020 21:55 )  Color: Dark Yellow / Appearance: Turbid / S.025 / pH: x  Gluc: x / Ketone: Negative  / Bili: Negative / Urobili: Negative   Blood: x / Protein: 100 / Nitrite: Negative   Leuk Esterase: Large / RBC: >50 /hpf / WBC >50 /HPF   Sq Epi: x / Non Sq Epi: 5 / Bacteria: TNTC    POCT Blood Glucose.: 188 mg/dL (02 Mar 2020 21:15)        IMAGING:    EXAM:  XR CHEST PORTABLE URGENT 1V                        ******PRELIMINARY REPORT******        INTERPRETATION:  no emergent findings      EXAM:  CT BRAIN                        PROCEDURE DATE:  2020     IMPRESSION:  No acute intracranial hemorrhage, territorial infarct, mass effect or displaced calvarial fracture.

## 2020-03-03 NOTE — H&P ADULT - ATTENDING COMMENTS
Pt seen and examined. Pt with 1 day of confusion in setting of leukocytosis/fever and +UA at rehab. Septic on arrival to ED with hypoxia, +UA and MIRIAM on labs. Encephalopathy likely 2/2 sepsis from UTI, will c/w ceftriaxone for now, monitor vitals closely, monitor mental status. MIRIAM with Cr 3, with baseline ~1, likely prerenal, will c/w IVF and monitor Cr. Pt significantly hypoxic in ED, now much improved - unclear etiology, will eval for PE (unable to do CTA 2/2 MIRIAM) and check RVP.    Patient assigned to me by night hospitalist in charge for management and care for patient for this evening only. Care to be resumed by day hospitalist in the morning and thereafter. Pt seen and examined. Pt with 1 day of confusion in setting of fever and +UA at rehab. Septic on arrival to ED with hypoxia, 7% bands on labs, +UA and MIRIAM on labs. Encephalopathy likely 2/2 sepsis from UTI, will c/w ceftriaxone for now, monitor vitals closely, monitor mental status. MIRIAM with Cr 3, with baseline ~1, likely prerenal, will c/w IVF and monitor Cr. Pt significantly hypoxic in ED, now much improved - unclear etiology, will eval for PE (unable to do CTA 2/2 MIRIAM) and check RVP.    Patient assigned to me by night hospitalist in charge for management and care for patient for this evening only. Care to be resumed by day hospitalist in the morning and thereafter.

## 2020-03-03 NOTE — CONSULT NOTE ADULT - PROBLEM SELECTOR RECOMMENDATION 8
Family updated on PCU plan of care, confirmed symptom-directed focus but give the patient a chance to recover to her previous functional status. They are amenable to transfer to PCU service. Emotional support provided. Discussed with RN, Medicine.

## 2020-03-03 NOTE — CONSULT NOTE ADULT - PROBLEM SELECTOR RECOMMENDATION 5
Chronic ulcers but noted to be malodorous  -Podiatry following, s/p bedside excision  -f/u Wound Cx, may need to expand Abx  -operative intervention (i.e. amputation, angioplasty) would not be within GOC

## 2020-03-03 NOTE — ED ADULT NURSE REASSESSMENT NOTE - NS ED NURSE REASSESS COMMENT FT1
Pt taken off O2 as per MD Malone. Pt sating 94% on room air, MD Malone reports this is okay as pt family does not want patient admitted to the hospital. Pt now A&Ox1, disoriented to place and time and talking about things that are not there, family made RN aware, MD Hays aware of this finding. Safety and comfort measures provided, bed locked and in lowest position, side rails up for safety. Call bell within reach. Awaiting MD reevaluation

## 2020-03-03 NOTE — CONSULT NOTE ADULT - ASSESSMENT
Transfer to PCU Service 89yo F with PMH of Uterine CA (dx 2019, untreated), T2DM, HTN, and Chronic b/l Foot Ulcers p/w AMS in the setting of UTI +/- wound infection. Palliative consulted for GOC, transferred to PCU service.

## 2020-03-03 NOTE — CONSULT NOTE ADULT - SUBJECTIVE AND OBJECTIVE BOX
HPI:  89 year old female with PMH uterine cancer, HTN, T2DM, and chronic b/l foot ulcers who presents from [facility] with AMS. Patient poor historian, history obtained from nursing home staff. Baseline mental status is A&Ox3. Yesterday at 4:40 pm the patient started to exhibit signs of confusion and disorientation. She was also hard to arouse. She continued to be confused throughout the evening. She did not complain of any specific symptoms. The patient also experienced a fall yesterday. She was found next to her bed and the cause of the fall was unknown. Stat labs were drawn and she was found with WBC 16 and a positive urinalysis. She also experienced a fever and hypoxia to the 80s. She was started on zosyn.  Her family member was notified and they wanted her to be sent to [facility] ED.    In the ED, vitals were T102.3, -112, BP /, RR 20-28, and 88% requiring NRB, BiPAP and eventually transitioned to nasal cannula. She received 1L LR bolus x1, 1L NS bolus x1, ceftriaxone 1g x1, toradol 15 mg x1, 650 mg acetaminophen suppository.   BCX and UCX were sent. (03 Mar 2020 03:53)    PERTINENT PM/SXH:   HTN (hypertension)  CAD (coronary artery disease)  Breast Cancer  CVA (Cerebral Infarction)  History of Obesity  Diabetes Mellitus  Benign Hypertension    Fracture of shaft of tibia and fibula  S/P PTCA (percutaneous transluminal coronary angioplasty)  Ankle Injury  History of Mastectomy    FAMILY HISTORY:  No pertinent family history in first degree relatives    ITEMS NOT CHECKED ARE NOT PRESENT    SOCIAL HISTORY:   Significant other/partner:  []  Children:  []  Scientologist/Spirituality:  Substance hx:  []   Tobacco hx:  []   Alcohol hx: []   Home Opioid hx:  [] I-Stop Reference No:  Living Situation: []Home  []Long term care  []Rehab []Other    ADVANCE DIRECTIVES:    DNR  Yes   []MOLST  []Living Will  DECISION MAKER(s):  [] Health Care Proxy(s)  [] Surrogate(s)  [] Guardian           Name(s):              Phone Number(s):    BASELINE (I)ADL(s) (prior to admission):  [place]: []Total  [] Moderate []Dependent    Allergies    No Known Allergies    Intolerances    MEDICATIONS  (STANDING):  cefTRIAXone   IVPB 1000 milliGRAM(s) IV Intermittent every 24 hours  collagenase Ointment 1 Application(s) Topical daily  Dakins Solution - 1/4 Strength 1 Application(s) Topical daily  dextrose 5% + sodium chloride 0.9%. 1000 milliLiter(s) (75 mL/Hr) IV Continuous <Continuous>  dextrose 5%. 1000 milliLiter(s) (50 mL/Hr) IV Continuous <Continuous>  dextrose 50% Injectable 12.5 Gram(s) IV Push once  dextrose 50% Injectable 25 Gram(s) IV Push once  dextrose 50% Injectable 25 Gram(s) IV Push once  gabapentin 400 milliGRAM(s) Oral at bedtime  heparin  Injectable 5000 Unit(s) SubCutaneous every 8 hours  insulin lispro (HumaLOG) corrective regimen sliding scale   SubCutaneous three times a day before meals  insulin lispro (HumaLOG) corrective regimen sliding scale   SubCutaneous at bedtime  melatonin 5 milliGRAM(s) Oral at bedtime  mirtazapine 7.5 milliGRAM(s) Oral at bedtime  pantoprazole    Tablet 40 milliGRAM(s) Oral before breakfast  predniSONE   Tablet 5 milliGRAM(s) Oral daily    MEDICATIONS  (PRN):  dextrose 40% Gel 15 Gram(s) Oral once PRN Blood Glucose LESS THAN 70 milliGRAM(s)/deciliter  glucagon  Injectable 1 milliGRAM(s) IntraMuscular once PRN Glucose LESS THAN 70 milligrams/deciliter    PRESENT SYMPTOMS: []Unable to obtain due to poor mentation   Source if other than patient:  []Family   []Team     Pain: [ ] yes [ ] no  QOL impact -   Location -                    Aggravating factors -  Quality -  Radiation -  Timing-  Severity (0-10 scale):  Minimal acceptable level (0-10 scale):    PAIN AD Score:     http://geriatrictoolkit.[place].[email or web address]/painad.pdf (press ctrl +  left click to view)    Dyspnea:                           []Mild  []Moderate []Severe  Anxiety:                             []Mild []Moderate []Severe  Fatigue:                             []Mild []Moderate []Severe  Nausea:                             []Mild []Moderate []Severe  Loss of appetite:              []Mild []Moderate []Severe  Constipation:                    []Mild []Moderate []Severe    Other Symptoms:  []All other review of systems negative     Karnofsky Performance Score/Palliative Performance Status Version 2:         %    http://npcrc.org/files/news/palliative_performance_scale_ppsv2.pdf    PHYSICAL EXAM:  Vital Signs Last 24 Hrs  T(C): 36.7 (03 Mar 2020 08:49), Max: 39.1 (02 Mar 2020 21:17)  T(F): 98.1 (03 Mar 2020 08:49), Max: 102.3 (02 Mar 2020 21:17)  HR: 89 (03 Mar 2020 08:49) (89 - 124)  BP: 93/60 (03 Mar 2020 08:49) (92/82 - 177/142)  BP(mean): --  RR: 18 (03 Mar 2020 08:49) (18 - 28)  SpO2: 97% (03 Mar 2020 08:49) (88% - 100%) I&O's Summary  GENERAL:  []Alert  []Oriented x3   []Lethargic  []Cachexia  []Unarousable  []Verbal  []Non-Verbal  Behavioral:   [] Anxiety  [] Delirium [] Agitation [] Other  HEENT:  []Normal   []Dry mouth   []ET Tube/Trach  []Oral lesions  PULMONARY:   []Clear []Tachypnea  []Audible excessive secretions   []Rhonchi        []Right []Left []Bilateral  []Crackles        []Right []Left []Bilateral  []Wheezing     []Right []Left []Bilateral  CARDIOVASCULAR:    []Regular []Irregular []Tachy  []Michael []Murmur []Other  GASTROINTESTINAL:  []Soft  []Distended   []+BS  []Non tender []Tender  []PEG []OGT/ NGT  Last BM:   GENITOURINARY:  []Normal [] Incontinent   []Oliguria/Anuria   []Soler  MUSCULOSKELETAL:   []Normal   []Weakness  []Bed/Wheelchair bound []Edema  NEUROLOGIC:   []No focal deficits  [] Cognitive impairment  [] Dysphagia []Dysarthria [] Paresis []Other   SKIN:   []Normal   []Pressure ulcer(s)  []Rash    CRITICAL CARE:  [ ] Shock Present  [ ]Septic [ ]Cardiogenic [ ]Neurologic [ ]Hypovolemic  [ ]  Vasopressors [ ]  Inotropes   [ ] Respiratory failure present [ ] Mechanical Ventilation [ ] Non-invasive ventilatory support [ ] High-Flow  [ ] Acute  [ ] Chronic [ ] Hypoxic  [ ] Hypercarbic [ ] Other  [ ] Other organ failure    LABS:                        9.2    11.39 )-----------( 147      ( 03 Mar 2020 04:48 )             30.3   03-    137  |  106  |  67<H>  ----------------------------<  99  4.3   |  17<L>  |  3.15<H>    Ca    8.1<L>      03 Mar 2020 04:48  Phos  3.3     -  Mg     1.7         TPro  6.0  /  Alb  2.4<L>  /  TBili  0.4  /  DBili  x   /  AST  19  /  ALT  9<L>  /  AlkPhos  73        Urinalysis Basic - ( 02 Mar 2020 21:55 )    Color: Dark Yellow / Appearance: Turbid / S.025 / pH: x  Gluc: x / Ketone: Negative  / Bili: Negative / Urobili: Negative   Blood: x / Protein: 100 / Nitrite: Negative   Leuk Esterase: Large / RBC: >50 /hpf / WBC >50 /HPF   Sq Epi: x / Non Sq Epi: 5 / Bacteria: TNTC      RADIOLOGY & ADDITIONAL STUDIES:    PROTEIN CALORIE MALNUTRITION PRESENT: []Yes []No  []PPSV2 < or = to 30% []significant weight loss  []poor nutritional intake []catabolic state []anasarca     Albumin, Serum: 2.4 g/dL (20 @ 22:37)  Artificial Nutrition []     REFERRALS:   []Chaplaincy  []Hospice  []Child Life  [x]Social Work  []Case management []Holistic Therapy     Goals of Care Document:   Care Coordination Assessment [C. Provider] (20 @ 12:52)   Progress Notes - Care Coordination [C. Provider] (20 @ 12:39) HPI: 89yo F with PMH of Uterine CA (dx 2019, untreated), T2DM, HTN, and Chronic b/l Foot Ulcers presenting from [facility] with AMS. Patient is unable to participate in interview, collateral obtained from chart and niece (HCP). Patient reportedly AAOx3 at baseline, interactive and conversant. Normally using her Ipad and computer. Prior to admission, patient was noted to be confused and disoriented. Later in the day, she was found next to her bed on the floor after a presumed fall. Patient was brought to ED and found to have a UTI and renal failure. Initially admitted to Medicine service and brought to the PCU as a boarder. Patient had previous advanced directives designating her DNR/DNI. Patient unable to answer questions.     PERTINENT PM/SXH:   HTN (hypertension)  CAD (coronary artery disease)  Breast Cancer  CVA (Cerebral Infarction)  History of Obesity  Diabetes Mellitus  Benign Hypertension    Fracture of shaft of tibia and fibula  S/P PTCA (percutaneous transluminal coronary angioplasty)  Ankle Injury  History of Mastectomy    FAMILY HISTORY:  No pertinent family history in first degree relatives    ITEMS NOT CHECKED ARE NOT PRESENT    SOCIAL HISTORY:   Significant other/partner:  [x]  for ~26yrs  Children:  []  Sabianist/Spirituality: Jainism  Substance hx:  []   Tobacco hx:  [x] 40yr Hx, quit 18yrs ago Alcohol hx: []   Home Opioid hx:  [x] I-Stop Reference No: [id number]  Living Situation: []Home  [x]Long term care  []Rehab []Other    ADVANCE DIRECTIVES:    DNR  Yes   [x]MOLST  []Living Will  DECISION MAKER(s):  [x] Health Care Proxy(s)  [] Surrogate(s)  [] Guardian           Name(s):  Gretchen Nick            Phone Number(s): [phone]    BASELINE (I)ADL(s) (prior to admission):  [place]: []Total  [] Moderate []Dependent    Allergies    No Known Allergies    Intolerances    MEDICATIONS  (STANDING):  cefTRIAXone   IVPB 1000 milliGRAM(s) IV Intermittent every 24 hours  collagenase Ointment 1 Application(s) Topical daily  Dakins Solution - 1/4 Strength 1 Application(s) Topical daily  dextrose 5% + sodium chloride 0.9%. 1000 milliLiter(s) (75 mL/Hr) IV Continuous <Continuous>  dextrose 5%. 1000 milliLiter(s) (50 mL/Hr) IV Continuous <Continuous>  dextrose 50% Injectable 12.5 Gram(s) IV Push once  dextrose 50% Injectable 25 Gram(s) IV Push once  dextrose 50% Injectable 25 Gram(s) IV Push once  gabapentin 400 milliGRAM(s) Oral at bedtime  heparin  Injectable 5000 Unit(s) SubCutaneous every 8 hours  insulin lispro (HumaLOG) corrective regimen sliding scale   SubCutaneous three times a day before meals  insulin lispro (HumaLOG) corrective regimen sliding scale   SubCutaneous at bedtime  melatonin 5 milliGRAM(s) Oral at bedtime  mirtazapine 7.5 milliGRAM(s) Oral at bedtime  pantoprazole    Tablet 40 milliGRAM(s) Oral before breakfast  predniSONE   Tablet 5 milliGRAM(s) Oral daily    MEDICATIONS  (PRN):  dextrose 40% Gel 15 Gram(s) Oral once PRN Blood Glucose LESS THAN 70 milliGRAM(s)/deciliter  glucagon  Injectable 1 milliGRAM(s) IntraMuscular once PRN Glucose LESS THAN 70 milligrams/deciliter    PRESENT SYMPTOMS: []Unable to obtain due to poor mentation   Source if other than patient:  []Family   []Team     Pain: [ ] yes [ ] no  QOL impact -   Location -                    Aggravating factors -  Quality -  Radiation -  Timing-  Severity (0-10 scale):  Minimal acceptable level (0-10 scale):    PAIN AD Score:     http://geriatrictoolkit.[facility]/cog/painad.pdf (press ctrl +  left click to view)    Dyspnea:                           []Mild  []Moderate []Severe  Anxiety:                             []Mild []Moderate []Severe  Fatigue:                             []Mild []Moderate []Severe  Nausea:                             []Mild []Moderate []Severe  Loss of appetite:              []Mild []Moderate []Severe  Constipation:                    []Mild []Moderate []Severe    Other Symptoms:  []All other review of systems negative     Karnofsky Performance Score/Palliative Performance Status Version 2:         %    http://npcrc.org/files/news/palliative_performance_scale_ppsv2.pdf    PHYSICAL EXAM:  Vital Signs Last 24 Hrs  T(C): 36.7 (03 Mar 2020 08:49), Max: 39.1 (02 Mar 2020 21:17)  T(F): 98.1 (03 Mar 2020 08:49), Max: 102.3 (02 Mar 2020 21:17)  HR: 89 (03 Mar 2020 08:49) (89 - 124)  BP: 93/60 (03 Mar 2020 08:49) (92/82 - 177/142)  BP(mean): --  RR: 18 (03 Mar 2020 08:49) (18 - 28)  SpO2: 97% (03 Mar 2020 08:49) (88% - 100%) I&O's Summary  GENERAL:  []Alert  []Oriented x3   []Lethargic  []Cachexia  []Unarousable  []Verbal  []Non-Verbal  Behavioral:   [] Anxiety  [] Delirium [] Agitation [] Other  HEENT:  []Normal   []Dry mouth   []ET Tube/Trach  []Oral lesions  PULMONARY:   []Clear []Tachypnea  []Audible excessive secretions   []Rhonchi        []Right []Left []Bilateral  []Crackles        []Right []Left []Bilateral  []Wheezing     []Right []Left []Bilateral  CARDIOVASCULAR:    []Regular []Irregular []Tachy  []Michael []Murmur []Other  GASTROINTESTINAL:  []Soft  []Distended   []+BS  []Non tender []Tender  []PEG []OGT/ NGT  Last BM:   GENITOURINARY:  []Normal [] Incontinent   []Oliguria/Anuria   []Soler  MUSCULOSKELETAL:   []Normal   []Weakness  []Bed/Wheelchair bound []Edema  NEUROLOGIC:   []No focal deficits  [] Cognitive impairment  [] Dysphagia []Dysarthria [] Paresis []Other   SKIN:   []Normal   []Pressure ulcer(s)  []Rash    CRITICAL CARE:  [ ] Shock Present  [ ]Septic [ ]Cardiogenic [ ]Neurologic [ ]Hypovolemic  [ ]  Vasopressors [ ]  Inotropes   [ ] Respiratory failure present [ ] Mechanical Ventilation [ ] Non-invasive ventilatory support [ ] High-Flow  [ ] Acute  [ ] Chronic [ ] Hypoxic  [ ] Hypercarbic [ ] Other  [ ] Other organ failure    LABS:                        9.2    11.39 )-----------( 147      ( 03 Mar 2020 04:48 )             30.3   03-03    137  |  106  |  67<H>  ----------------------------<  99  4.3   |  17<L>  |  3.15<H>    Ca    8.1<L>      03 Mar 2020 04:48  Phos  3.3     -  Mg     1.7     -    TPro  6.0  /  Alb  2.4<L>  /  TBili  0.4  /  DBili  x   /  AST  19  /  ALT  9<L>  /  AlkPhos  73  03-      Urinalysis Basic - ( 02 Mar 2020 21:55 )    Color: Dark Yellow / Appearance: Turbid / S.025 / pH: x  Gluc: x / Ketone: Negative  / Bili: Negative / Urobili: Negative   Blood: x / Protein: 100 / Nitrite: Negative   Leuk Esterase: Large / RBC: >50 /hpf / WBC >50 /HPF   Sq Epi: x / Non Sq Epi: 5 / Bacteria: TNTC      RADIOLOGY & ADDITIONAL STUDIES:    PROTEIN CALORIE MALNUTRITION PRESENT: []Yes []No  []PPSV2 < or = to 30% []significant weight loss  []poor nutritional intake []catabolic state []anasarca     Albumin, Serum: 2.4 g/dL (20 @ 22:37)  Artificial Nutrition []     REFERRALS:   []Chaplaincy  []Hospice  []Child Life  [x]Social Work  []Case management []Holistic Therapy     Goals of Care Document:   Care Coordination Assessment [C. Provider] (20 @ 12:52)   Progress Notes - Care Coordination [C. Provider] (20 @ 12:39) HPI: 89yo F with PMH of Uterine CA (dx 2019, untreated), T2DM, HTN, and Chronic b/l Foot Ulcers presenting from [facility] with AMS. Patient is unable to participate in interview, collateral obtained from chart and niece (HCP). Patient reportedly AAOx3 at baseline, interactive and conversant. Normally using her Ipad and computer. Prior to admission, patient was noted to be confused and disoriented. Later in the day, she was found next to her bed on the floor after a presumed fall. Patient was brought to ED and found to have a UTI and renal failure. Initially admitted to Medicine service and brought to the PCU as a boarder. Patient had previous advanced directives designating her DNR/DNI. Patient unable to answer questions.     PERTINENT PM/SXH:   HTN (hypertension)  CAD (coronary artery disease)  Breast Cancer  CVA (Cerebral Infarction)  History of Obesity  Diabetes Mellitus  Benign Hypertension    Fracture of shaft of tibia and fibula  S/P PTCA (percutaneous transluminal coronary angioplasty)  Ankle Injury  History of Mastectomy    FAMILY HISTORY:  No pertinent family history in first degree relatives    ITEMS NOT CHECKED ARE NOT PRESENT    SOCIAL HISTORY:   Significant other/partner:  [x]  for ~26yrs  Children:  []  Rastafari/Spirituality: Latter-day  -no significant other, siblings, or children  Substance hx:  []   Tobacco hx:  [x] 40yr Hx, quit 18yrs ago Alcohol hx: []   Home Opioid hx:  [x] I-Stop Reference No: [id number]  Rx Written	Rx Dispensed	Drug	Quantity	Days Supply	Prescriber Name	Payment Method	Dispenser  	oxycodone hcl 5 mg tablet	15	15	Rubina Arora NP	Medicare	[facility]  	oxycodone hcl 5 mg tablet	15	15	Rubina Arora NP	Medicare	[facility]  	oxycodone hcl 5 mg tablet	14	14	Rubina Arora NP	Medicare	[facility]  	oxycodone hcl 5 mg tablet	5	5	Rubina Arora NP	Medicare	[facility]  	clonazepam 0.5 mg tablet	7	14	Rubina Arora NP	Medicare	[facility]  	alprazolam 0.25 mg tablet	15	15	Tonja Pringle	Medicare	[facility]    Living Situation: []Home  [x]Long term care  []Rehab []Other    ADVANCE DIRECTIVES:    DNR  Yes   [x]MOLST  []Living Will  DECISION MAKER(s):  [x] Health Care Proxy(s)  [] Surrogate(s)  [] Guardian           Name(s):  Gretchen Nick            Phone Number(s): [phone]    BASELINE (I)ADL(s) (prior to admission):  [place]: []Total  [x] Moderate []Dependent  -has not been ambulatory for past year  -wheelchair bound but UE strength intact, able to wheel herself  -requires some assistance with cleaning    Allergies  No Known Allergies    MEDICATIONS  (STANDING):  cefTRIAXone   IVPB 1000 milliGRAM(s) IV Intermittent every 24 hours  collagenase Ointment 1 Application(s) Topical daily  Dakins Solution - 1/4 Strength 1 Application(s) Topical daily  dextrose 5% + sodium chloride 0.9%. 1000 milliLiter(s) (75 mL/Hr) IV Continuous <Continuous>  gabapentin 400 milliGRAM(s) Oral at bedtime  heparin  Injectable 5000 Unit(s) SubCutaneous every 8 hours  insulin lispro (HumaLOG) corrective regimen sliding scale   SubCutaneous three times a day before meals  insulin lispro (HumaLOG) corrective regimen sliding scale   SubCutaneous at bedtime  melatonin 5 milliGRAM(s) Oral at bedtime  mirtazapine 7.5 milliGRAM(s) Oral at bedtime  pantoprazole    Tablet 40 milliGRAM(s) Oral before breakfast  predniSONE   Tablet 5 milliGRAM(s) Oral daily    PRESENT SYMPTOMS: [x]Unable to obtain due to poor mentation   Source if other than patient:  [x]Family   []Team     Pain: [ ] yes [x] no  QOL impact -   Location -                    Aggravating factors -  Quality -  Radiation -  Timing-  Severity (0-10 scale):  Minimal acceptable level (0-10 scale):    PAIN AD Score: 1    http://geriatrictoolkit.[place].[email or web address]/cog/painad.pdf (press ctrl +  left click to view)    Dyspnea:                           []Mild  []Moderate []Severe  Anxiety:                             []Mild []Moderate []Severe  Fatigue:                             []Mild []Moderate []Severe  Nausea:                             []Mild []Moderate []Severe  Loss of appetite:              []Mild []Moderate []Severe  Constipation:                    []Mild []Moderate []Severe    Other Symptoms:  []All other review of systems negative     Karnofsky Performance Score/Palliative Performance Status Version 2:         30%    http://[facility].org/files/news/palliative_performance_scale_ppsv2.pdf    PHYSICAL EXAM:  Vital Signs Last 24 Hrs  T(C): 36.7 (03 Mar 2020 08:49), Max: 39.1 (02 Mar 2020 21:17)  T(F): 98.1 (03 Mar 2020 08:49), Max: 102.3 (02 Mar 2020 21:17)  HR: 89 (03 Mar 2020 08:49) (89 - 124)  BP: 93/60 (03 Mar 2020 08:49) (92/82 - 177/142)  RR: 18 (03 Mar 2020 08:49) (18 - 28)  SpO2: 97% (03 Mar 2020 08:49) (88% - 100%) I&O's Summary  GENERAL:  []Alert  []Oriented x3   [x]Lethargic  []Cachexia  []Unarousable  [x]Verbal  []Non-Verbal  Behavioral:   [] Anxiety  [x] Delirium [] Agitation [] Other  HEENT:  []Normal   [x]Dry mouth   []ET Tube/Trach  []Oral lesions  PULMONARY:   [x]Clear []Tachypnea  []Audible excessive secretions   []Rhonchi        []Right []Left []Bilateral  []Crackles        []Right []Left []Bilateral  []Wheezing     []Right []Left []Bilateral  CARDIOVASCULAR:    [x]Regular []Irregular []Tachy  []Michael []Murmur []Other  GASTROINTESTINAL:  [x]Soft  []Distended   [x]+BS  [x]Non tender []Tender  []PEG []OGT/ NGT  Last BM: ?  GENITOURINARY:  []Normal [x] Incontinent   []Oliguria/Anuria   []Soler  MUSCULOSKELETAL:   []Normal   []Weakness  [x]Bed/Wheelchair bound []Edema  NEUROLOGIC:   []No focal deficits  [x] Cognitive impairment  [] Dysphagia []Dysarthria [] Paresis []Other   SKIN:   []Normal   [x]Pressure ulcer(s): bilateral heels []Rash    CRITICAL CARE:  [ ] Shock Present  [ ]Septic [ ]Cardiogenic [ ]Neurologic [ ]Hypovolemic  [ ]  Vasopressors [ ]  Inotropes   [ ] Respiratory failure present [ ] Mechanical Ventilation [ ] Non-invasive ventilatory support [ ] High-Flow  [ ] Acute  [ ] Chronic [ ] Hypoxic  [ ] Hypercarbic [ ] Other  [x] Other organ failure: Renal    LABS:                        9.2    11.39 )-----------( 147      ( 03 Mar 2020 04:48 )             30.3     137  |  106  |  67<H>  ----------------------------<  99  4.3   |  17<L>  |  3.15<H>    Ca    8.1<L>      03 Mar 2020 04:48  Phos  3.3       Mg     1.7         TPro  6.0  /  Alb  2.4<L>  /  TBili  0.4  /  DBili  x   /  AST  19  /  ALT  9<L>  /  AlkPhos  73        Urinalysis Basic - ( 02 Mar 2020 21:55 )  Color: Dark Yellow / Appearance: Turbid / S.025 / pH: x  Gluc: x / Ketone: Negative  / Bili: Negative / Urobili: Negative   Blood: x / Protein: 100 / Nitrite: Negative   Leuk Esterase: Large / RBC: >50 /hpf / WBC >50 /HPF   Sq Epi: x / Non Sq Epi: 5 / Bacteria: TNTC    RADIOLOGY & ADDITIONAL STUDIES:  Xray Chest 1 View- PORTABLE-Urgent (20 @ 02:31)   IMPRESSION: Clear lungs.    CT Head No Cont (20 @ 23:45)  IMPRESSION: No acute intracranial hemorrhage, territorial infarct, mass effect or displaced calvarial fracture.    PROTEIN CALORIE MALNUTRITION PRESENT: [x]Yes []No  [x]PPSV2 < or = to 30% []significant weight loss  []poor nutritional intake []catabolic state []anasarca     Albumin, Serum: 2.4 g/dL (20 @ 22:37)  Artificial Nutrition []     REFERRALS:   []Chaplaincy  [x]Hospice  []Child Life  [x]Social Work  []Case management []Holistic Therapy HPI: 89yo F with PMH of Uterine CA (dx 2019, untreated), T2DM, HTN, and Chronic b/l Foot Ulcers presenting from [facility] with AMS. Patient is unable to participate in interview, collateral obtained from chart and niece (HCP). Patient reportedly AAOx3 at baseline, interactive and conversant. Normally using her Ipad and computer. Prior to admission, patient was noted to be confused and disoriented. Later in the day, she was found next to her bed on the floor after a presumed fall. Patient was brought to ED and found to have a UTI and renal failure. Initially admitted to Medicine service and brought to the PCU as a boarder. Patient had previous advanced directives designating her DNR/DNI. Patient unable to answer questions.     PERTINENT PM/SXH:   HTN (hypertension)  CAD (coronary artery disease)  Breast Cancer  CVA (Cerebral Infarction)  History of Obesity  Diabetes Mellitus  Benign Hypertension    Fracture of shaft of tibia and fibula  S/P PTCA (percutaneous transluminal coronary angioplasty)  Ankle Injury  History of Mastectomy    FAMILY HISTORY:  No pertinent family history in first degree relatives    ITEMS NOT CHECKED ARE NOT PRESENT    SOCIAL HISTORY:   Significant other/partner:  [x]  for ~26yrs  Children:  []  Holiness/Spirituality: Temple  -no significant other, siblings, or children  Substance hx:  []   Tobacco hx:  [x] 40yr Hx, quit 18yrs ago Alcohol hx: []   Home Opioid hx:  [x] I-Stop Reference No: [id number]  Rx Written	Rx Dispensed	Drug	Quantity	Days Supply	Prescriber Name	Payment Method	Dispenser  	oxycodone hcl 5 mg tablet	15	15	Rubina Arora NP	Medicare	[facility]  	oxycodone hcl 5 mg tablet	15	15	Rubina Arora NP	Medicare	[facility]  	oxycodone hcl 5 mg tablet	14	14	Rubina Arora NP	Medicare	[facility]  	oxycodone hcl 5 mg tablet	5	5	Rubina Arora NP	Medicare	[facility]  	clonazepam 0.5 mg tablet	7	14	Rubina Arora NP	Medicare	[facility]  	alprazolam 0.25 mg tablet	15	15	Tonja Pringle	Medicare	[facility]    Living Situation: []Home  [x]Long term care  []Rehab []Other    ADVANCE DIRECTIVES:    DNR  Yes   [x]MOLST  []Living Will  DECISION MAKER(s):  [x] Health Care Proxy(s)  [] Surrogate(s)  [] Guardian           Name(s):  Gretchen Nick            Phone Number(s): [phone]    BASELINE (I)ADL(s) (prior to admission):  [place]: []Total  [x] Moderate []Dependent  -has not been ambulatory for past year  -wheelchair bound but UE strength intact, able to wheel herself  -requires some assistance with cleaning    Allergies  No Known Allergies    MEDICATIONS  (STANDING):  cefTRIAXone   IVPB 1000 milliGRAM(s) IV Intermittent every 24 hours  collagenase Ointment 1 Application(s) Topical daily  Dakins Solution - 1/4 Strength 1 Application(s) Topical daily  dextrose 5% + sodium chloride 0.9%. 1000 milliLiter(s) (75 mL/Hr) IV Continuous <Continuous>  gabapentin 400 milliGRAM(s) Oral at bedtime  heparin  Injectable 5000 Unit(s) SubCutaneous every 8 hours  insulin lispro (HumaLOG) corrective regimen sliding scale   SubCutaneous three times a day before meals  insulin lispro (HumaLOG) corrective regimen sliding scale   SubCutaneous at bedtime  melatonin 5 milliGRAM(s) Oral at bedtime  mirtazapine 7.5 milliGRAM(s) Oral at bedtime  pantoprazole    Tablet 40 milliGRAM(s) Oral before breakfast  predniSONE   Tablet 5 milliGRAM(s) Oral daily    PRESENT SYMPTOMS: [x]Unable to obtain due to poor mentation   Source if other than patient:  [x]Family   []Team     Pain: [ ] yes [x] no  QOL impact -   Location -                    Aggravating factors -  Quality -  Radiation -  Timing-  Severity (0-10 scale):  Minimal acceptable level (0-10 scale):    PAIN AD Score: 1    [email or web address] (press ctrl +  left click to view)    Dyspnea:                           []Mild  []Moderate []Severe  Anxiety:                             []Mild []Moderate []Severe  Fatigue:                             []Mild []Moderate []Severe  Nausea:                             []Mild []Moderate []Severe  Loss of appetite:              []Mild []Moderate []Severe  Constipation:                    []Mild []Moderate []Severe    Other Symptoms:  []All other review of systems negative     Karnofsky Performance Score/Palliative Performance Status Version 2:         30%    [email or web address]    PHYSICAL EXAM:  Vital Signs Last 24 Hrs  T(C): 36.7 (03 Mar 2020 08:49), Max: 39.1 (02 Mar 2020 21:17)  T(F): 98.1 (03 Mar 2020 08:49), Max: 102.3 (02 Mar 2020 21:17)  HR: 89 (03 Mar 2020 08:49) (89 - 124)  BP: 93/60 (03 Mar 2020 08:49) (92/82 - 177/142)  RR: 18 (03 Mar 2020 08:49) (18 - 28)  SpO2: 97% (03 Mar 2020 08:49) (88% - 100%) I&O's Summary  GENERAL:  []Alert  []Oriented x3   [x]Lethargic  []Cachexia  []Unarousable  [x]Verbal  []Non-Verbal  Behavioral:   [] Anxiety  [x] Delirium [] Agitation [] Other  HEENT:  []Normal   [x]Dry mouth   []ET Tube/Trach  []Oral lesions  PULMONARY:   [x]Clear []Tachypnea  []Audible excessive secretions   []Rhonchi        []Right []Left []Bilateral  []Crackles        []Right []Left []Bilateral  []Wheezing     []Right []Left []Bilateral  CARDIOVASCULAR:    [x]Regular []Irregular []Tachy  []Michael []Murmur []Other  GASTROINTESTINAL:  [x]Soft  []Distended   [x]+BS  [x]Non tender []Tender  []PEG []OGT/ NGT  Last BM: ?  GENITOURINARY:  []Normal [x] Incontinent   []Oliguria/Anuria   []Soler  MUSCULOSKELETAL:   []Normal   []Weakness  [x]Bed/Wheelchair bound []Edema  NEUROLOGIC:   []No focal deficits  [x] Cognitive impairment  [] Dysphagia []Dysarthria [] Paresis []Other   SKIN:   []Normal   [x]Pressure ulcer(s): bilateral heels []Rash    CRITICAL CARE:  [ ] Shock Present  [ ]Septic [ ]Cardiogenic [ ]Neurologic [ ]Hypovolemic  [ ]  Vasopressors [ ]  Inotropes   [ ] Respiratory failure present [ ] Mechanical Ventilation [ ] Non-invasive ventilatory support [ ] High-Flow  [ ] Acute  [ ] Chronic [ ] Hypoxic  [ ] Hypercarbic [ ] Other  [x] Other organ failure: Renal    LABS:                        9.2    11.39 )-----------( 147      ( 03 Mar 2020 04:48 )             30.3     137  |  106  |  67<H>  ----------------------------<  99  4.3   |  17<L>  |  3.15<H>    Ca    8.1<L>      03 Mar 2020 04:48  Phos  3.3       Mg     1.7         TPro  6.0  /  Alb  2.4<L>  /  TBili  0.4  /  DBili  x   /  AST  19  /  ALT  9<L>  /  AlkPhos  73        Urinalysis Basic - ( 02 Mar 2020 21:55 )  Color: Dark Yellow / Appearance: Turbid / S.025 / pH: x  Gluc: x / Ketone: Negative  / Bili: Negative / Urobili: Negative   Blood: x / Protein: 100 / Nitrite: Negative   Leuk Esterase: Large / RBC: >50 /hpf / WBC >50 /HPF   Sq Epi: x / Non Sq Epi: 5 / Bacteria: TNTC    RADIOLOGY & ADDITIONAL STUDIES:  Xray Chest 1 View- PORTABLE-Urgent (20 @ 02:31)   IMPRESSION: Clear lungs.    CT Head No Cont (20 @ 23:45)  IMPRESSION: No acute intracranial hemorrhage, territorial infarct, mass effect or displaced calvarial fracture.    PROTEIN CALORIE MALNUTRITION PRESENT: [x]Yes []No  [x]PPSV2 < or = to 30% []significant weight loss  []poor nutritional intake []catabolic state []anasarca     Albumin, Serum: 2.4 g/dL (20 @ 22:37)  Artificial Nutrition []     REFERRALS:   []Chaplaincy  [x]Hospice  []Child Life  [x]Social Work  []Case management []Holistic Therapy HPI: 89yo F with PMH of Uterine CA (dx 2019, untreated), T2DM, HTN, and Chronic b/l Foot Ulcers presenting from [facility] [place] with AMS. Patient is unable to participate in interview, collateral obtained from chart and niece (HCP). Patient reportedly AAOx3 at baseline, interactive and conversant. Normally using her Ipad and computer. Prior to admission, patient was noted to be confused and disoriented. Later in the day, she was found next to her bed on the floor after a presumed fall. Patient was brought to ED and found to have a UTI and renal failure. Initially admitted to Medicine service and brought to the PCU as a boarder. Patient had previous advanced directives designating her DNR/DNI. Patient unable to answer questions.     PERTINENT PM/SXH:   HTN (hypertension)  CAD (coronary artery disease)  Breast Cancer  CVA (Cerebral Infarction)  History of Obesity  Diabetes Mellitus  Benign Hypertension    Fracture of shaft of tibia and fibula  S/P PTCA (percutaneous transluminal coronary angioplasty)  Ankle Injury  History of Mastectomy    FAMILY HISTORY:  No pertinent family history in first degree relatives    ITEMS NOT CHECKED ARE NOT PRESENT    SOCIAL HISTORY:   Significant other/partner:  [x]  for ~26yrs  Children:  []  Catholic/Spirituality: Caodaism  -no significant other, siblings, or children  Substance hx:  []   Tobacco hx:  [x] 40yr Hx, quit 18yrs ago Alcohol hx: []   Home Opioid hx:  [x] I-Stop Reference No: [id number]  Rx Written	Rx Dispensed	Drug	Quantity	Days Supply	Prescriber Name	Payment Method	Dispenser  	oxycodone hcl 5 mg tablet	15	15	Rubina Arora NP	Medicare	[facility]  	oxycodone hcl 5 mg tablet	15	15	Rubina Arora NP	Medicare	[facility]  	oxycodone hcl 5 mg tablet	14	14	Rubina Arora NP	Medicare	[facility]  	oxycodone hcl 5 mg tablet	5	5	Rubina Arora NP	Medicare	[facility]  	clonazepam 0.5 mg tablet	7	14	Rubina Arora NP	Medicare	[facility]  	alprazolam 0.25 mg tablet	15	15	Tonja Pringle	Medicare	[facility]    Living Situation: []Home  [x]Long term care  []Rehab []Other    ADVANCE DIRECTIVES:    DNR  Yes   [x]MOLST  []Living Will  DECISION MAKER(s):  [x] Health Care Proxy(s)  [] Surrogate(s)  [] Guardian           Name(s):  Gretchen Nick            Phone Number(s): [phone]    BASELINE (I)ADL(s) (prior to admission):  [place]: []Total  [x] Moderate []Dependent  -has not been ambulatory for past year  -wheelchair bound but UE strength intact, able to wheel herself  -requires some assistance with cleaning    ALLERGIES:  No Known Allergies    MEDICATIONS  (STANDING):  cefTRIAXone   IVPB 1000 milliGRAM(s) IV Intermittent every 24 hours  collagenase Ointment 1 Application(s) Topical daily  Dakins Solution - 1/4 Strength 1 Application(s) Topical daily  dextrose 5% + sodium chloride 0.9%. 1000 milliLiter(s) (75 mL/Hr) IV Continuous <Continuous>  gabapentin 400 milliGRAM(s) Oral at bedtime  heparin  Injectable 5000 Unit(s) SubCutaneous every 8 hours  insulin lispro (HumaLOG) corrective regimen sliding scale   SubCutaneous three times a day before meals  insulin lispro (HumaLOG) corrective regimen sliding scale   SubCutaneous at bedtime  melatonin 5 milliGRAM(s) Oral at bedtime  mirtazapine 7.5 milliGRAM(s) Oral at bedtime  pantoprazole    Tablet 40 milliGRAM(s) Oral before breakfast  predniSONE   Tablet 5 milliGRAM(s) Oral daily    PRESENT SYMPTOMS: [x]Unable to obtain due to poor mentation   Source if other than patient:  [x]Family   []Team     Pain: [x] yes [ ] no - See PAINAD  QOL impact -   Location -                    Aggravating factors -  Quality -  Radiation -  Timing-  Severity (0-10 scale):  Minimal acceptable level (0-10 scale):    PAIN AD Score: 2  [email or web address]/cog/painad.pdf (press ctrl +  left click to view)    Dyspnea:                           []Mild  []Moderate []Severe  Anxiety:                             []Mild []Moderate []Severe  Fatigue:                             []Mild []Moderate []Severe  Nausea:                             []Mild []Moderate []Severe  Loss of appetite:              []Mild []Moderate []Severe  Constipation:                    []Mild []Moderate []Severe    Other Symptoms:  [x]Unable to assess ROS due to encephalopathy    Karnofsky Performance Score/Palliative Performance Status Version 2:         20%    [email or web address]    PHYSICAL EXAM:  Vital Signs Last 24 Hrs  T(C): 36.7 (03 Mar 2020 08:49), Max: 39.1 (02 Mar 2020 21:17)  T(F): 98.1 (03 Mar 2020 08:49), Max: 102.3 (02 Mar 2020 21:17)  HR: 89 (03 Mar 2020 08:49) (89 - 124)  BP: 93/60 (03 Mar 2020 08:49) (92/82 - 177/142)  RR: 18 (03 Mar 2020 08:49) (18 - 28)  SpO2: 97% (03 Mar 2020 08:49) (88% - 100%) I&O's Summary    GENERAL:  []Alert  []Oriented x3   [x]Lethargic  []Cachexia  []Unarousable  [x]Verbal  []Non-Verbal  Behavioral:   [] Anxiety  [x] Delirium [] Agitation [] Other  HEENT:  []Normal   [x]Dry mouth   []ET Tube/Trach  []Oral lesions  PULMONARY:   [x]Clear []Tachypnea  []Audible excessive secretions   []Rhonchi        []Right []Left []Bilateral  []Crackles        []Right []Left []Bilateral  []Wheezing     []Right []Left []Bilateral  CARDIOVASCULAR:    [x]Regular []Irregular []Tachy  []Michael []Murmur []Other  GASTROINTESTINAL:  [x]Soft  []Distended   [x]+BS  [x]Non tender []Tender  []PEG []OGT/ NGT  Last BM: ?  GENITOURINARY:  []Normal [] Incontinent   [x]Oliguria/Anuria   []Soler  MUSCULOSKELETAL:   []Normal   []Weakness  [x]Bed/Wheelchair bound []Edema  NEUROLOGIC:   []No focal deficits  [x] Cognitive impairment  [] Dysphagia []Dysarthria [] Paresis []Other   SKIN:   []Normal   [x]Pressure ulcer(s): bilateral heels, L Great Toe wound []Rash    CRITICAL CARE:  [ ] Shock Present  [ ]Septic [ ]Cardiogenic [ ]Neurologic [ ]Hypovolemic  [ ]  Vasopressors [ ]  Inotropes   [ ] Respiratory failure present [ ] Mechanical Ventilation [ ] Non-invasive ventilatory support [ ] High-Flow  [ ] Acute  [ ] Chronic [ ] Hypoxic  [ ] Hypercarbic [ ] Other  [x] Other organ failure: Renal    LABS:                        9.2    11.39 )-----------( 147      ( 03 Mar 2020 04:48 )             30.3     137  |  106  |  67<H>  ----------------------------<  99  4.3   |  17<L>  |  3.15<H>    Ca    8.1<L>      03 Mar 2020 04:48  Phos  3.3     03-03  Mg     1.7     -    TPro  6.0  /  Alb  2.4<L>  /  TBili  0.4  /  DBili  x   /  AST  19  /  ALT  9<L>  /  AlkPhos  73  0302      Urinalysis Basic - ( 02 Mar 2020 21:55 )  Color: Dark Yellow / Appearance: Turbid / S.025 / pH: x  Gluc: x / Ketone: Negative  / Bili: Negative / Urobili: Negative   Blood: x / Protein: 100 / Nitrite: Negative   Leuk Esterase: Large / RBC: >50 /hpf / WBC >50 /HPF   Sq Epi: x / Non Sq Epi: 5 / Bacteria: TNTC    RADIOLOGY & ADDITIONAL STUDIES:  Xray Chest 1 View- PORTABLE-Urgent (20 @ 02:31)   IMPRESSION: Clear lungs.    CT Head No Cont (20 @ 23:45)  IMPRESSION: No acute intracranial hemorrhage, territorial infarct, mass effect or displaced calvarial fracture.    PROTEIN CALORIE MALNUTRITION PRESENT: [x]Yes []No  [x]PPSV2 < or = to 30% []significant weight loss  []poor nutritional intake []catabolic state []anasarca     Albumin, Serum: 2.4 g/dL (20 @ 22:37)  Artificial Nutrition []     REFERRALS:   []Chaplaincy  [x]Hospice  []Child Life  [x]Social Work  []Case management []Holistic Therapy HPI: 89yo F with PMH of Uterine CA (dx 2019, untreated), T2DM, HTN, and Chronic b/l Foot Ulcers presenting from [facility] [place] with AMS. Patient is unable to participate in interview, collaterally obtained information from chart and niece (HCP). Patient reportedly AAOx3 at baseline, interactive and conversant. Normally using her Ipad and computer. Prior to admission, patient was noted to be confused and disoriented. Later in the day, she was found next to her bed on the floor after a presumed fall. Patient was brought to ED and found to have a UTI and renal failure. Initially admitted to Medicine service and brought to the PCU as a boarder. Patient had previous advanced directives designating her DNR/DNI. Patient unable to answer questions.     PERTINENT PM/SXH:   HTN (hypertension)  CAD (coronary artery disease)  Breast Cancer  CVA (Cerebral Infarction)  History of Obesity  Diabetes Mellitus  Benign Hypertension    Fracture of shaft of tibia and fibula  S/P PTCA (percutaneous transluminal coronary angioplasty)  Ankle Injury  History of Mastectomy    FAMILY HISTORY:  No pertinent family history in first degree relatives of cancer, diabetes or heart disease    ITEMS NOT CHECKED ARE NOT PRESENT    SOCIAL HISTORY:   Significant other/partner:  [x]  for ~26yrs  Children:  []  Yarsanism/Spirituality: Zoroastrianism  -no significant other, siblings, or children  Substance hx:  []   Tobacco hx:  [x] 40yr Hx, quit 18yrs ago Alcohol hx: []   Home Opioid hx:  [x] I-Stop Reference No: [id number]  Rx Written	Rx Dispensed	Drug	Quantity	Days Supply	Prescriber Name	Payment Method	Dispenser  	oxycodone hcl 5 mg tablet	15	15	Rubina Arora NP	Medicare	[facility]  	oxycodone hcl 5 mg tablet	15	15	Rubina Arora NP	Medicare	[facility]  	oxycodone hcl 5 mg tablet	14	14	Rubina Arora NP	Medicare	[facility]  	oxycodone hcl 5 mg tablet	5	5	Rubina Arora NP	Medicare	[facility]  	clonazepam 0.5 mg tablet	7	14	Rubina Arora NP	Medicare	[facility]  	alprazolam 0.25 mg tablet	15	15	Tonja Pringle	Medicare	[facility]    Living Situation: []Home  [x]Long term care  []Rehab []Other    ADVANCE DIRECTIVES:    DNR  Yes   [x]MOLST  []Living Will  DECISION MAKER(s):  [x] Health Care Proxy(s)  [] Surrogate(s)  [] Guardian           Name(s):  Gretchen Nick            Phone Number(s): [phone]    BASELINE (I)ADL(s) (prior to admission):  [place]: []Total  [x] Moderate []Dependent  -has not been ambulatory for past year  -wheelchair bound but UE strength intact, able to wheel herself  -requires some assistance with cleaning    ALLERGIES:  No Known Allergies    MEDICATIONS  (STANDING):  cefTRIAXone   IVPB 1000 milliGRAM(s) IV Intermittent every 24 hours  collagenase Ointment 1 Application(s) Topical daily  Dakins Solution - 1/4 Strength 1 Application(s) Topical daily  dextrose 5% + sodium chloride 0.9%. 1000 milliLiter(s) (75 mL/Hr) IV Continuous <Continuous>  gabapentin 400 milliGRAM(s) Oral at bedtime  heparin  Injectable 5000 Unit(s) SubCutaneous every 8 hours  insulin lispro (HumaLOG) corrective regimen sliding scale   SubCutaneous three times a day before meals  insulin lispro (HumaLOG) corrective regimen sliding scale   SubCutaneous at bedtime  melatonin 5 milliGRAM(s) Oral at bedtime  mirtazapine 7.5 milliGRAM(s) Oral at bedtime  pantoprazole    Tablet 40 milliGRAM(s) Oral before breakfast  predniSONE   Tablet 5 milliGRAM(s) Oral daily    PRESENT SYMPTOMS: [x]Unable to obtain due to poor mentation   Source if other than patient:  [x]Family   []Team     Pain: [x] yes [ ] no - See PAINAD  QOL impact -   Location -                    Aggravating factors -  Quality -  Radiation -  Timing-  Severity (0-10 scale):  Minimal acceptable level (0-10 scale):    PAIN AD Score: 2  [email or web address]/cog/[email or web address].pdf (press ctrl +  left click to view)    Dyspnea:                           []Mild  []Moderate []Severe  Anxiety:                             []Mild []Moderate []Severe  Fatigue:                             []Mild []Moderate []Severe  Nausea:                             []Mild []Moderate []Severe  Loss of appetite:              []Mild []Moderate []Severe  Constipation:                    []Mild []Moderate []Severe    Other Symptoms:  [x]Unable to assess ROS due to metabolic encephalopathy    Karnofsky Performance Score/Palliative Performance Status Version 2:         20%    [email or web address]    PHYSICAL EXAM:  Vital Signs Last 24 Hrs  T(C): 36.7 (03 Mar 2020 08:49), Max: 39.1 (02 Mar 2020 21:17)  T(F): 98.1 (03 Mar 2020 08:49), Max: 102.3 (02 Mar 2020 21:17)  HR: 89 (03 Mar 2020 08:49) (89 - 124)  BP: 93/60 (03 Mar 2020 08:49) (92/82 - 177/142)  RR: 18 (03 Mar 2020 08:49) (18 - 28)  SpO2: 97% (03 Mar 2020 08:49) (88% - 100%) I&O's Summary    GENERAL:  []Alert  []Oriented x 0   [x]Lethargic  []Cachexia  []Unarousable  [x]Verbal  []Non-Verbal  Behavioral:   [] Anxiety  [x] Delirium [] Agitation [] Other  HEENT:  []Normal   [x]Dry mouth   []ET Tube/Trach  []Oral lesions  PULMONARY:   [x]Clear []Tachypnea  []Audible excessive secretions   []Rhonchi        []Right []Left []Bilateral  []Crackles        []Right []Left []Bilateral  []Wheezing     []Right []Left []Bilateral  CARDIOVASCULAR:    [x]Regular []Irregular []Tachy  []Michael []Murmur []Other  GASTROINTESTINAL:  [x]Soft  []Distended   [x]+BS  [x]Non tender []Tender  []PEG []OGT/ NGT  Last BM: ?  GENITOURINARY:  []Normal [x] Incontinent   [x]Oliguria/Anuria   []Soler  MUSCULOSKELETAL:   []Normal   []Weakness  [x]Bed/Wheelchair bound []Edema  NEUROLOGIC:   []No focal deficits  [x] Cognitive impairment  [] Dysphagia []Dysarthria [] Paresis []Other   SKIN:   []Normal   [x]Pressure ulcer(s): bilateral heels, L Great Toe wound []Rash    CRITICAL CARE:  [ ] Shock Present  [ ]Septic [ ]Cardiogenic [ ]Neurologic [ ]Hypovolemic  [ ]  Vasopressors [ ]  Inotropes   [ ] Respiratory failure present [ ] Mechanical Ventilation [ ] Non-invasive ventilatory support [ ] High-Flow  [ ] Acute  [ ] Chronic [ ] Hypoxic  [ ] Hypercarbic [ ] Other  [x] Other organ failure: Renal    LABS:                        9.2    11.39 )-----------( 147      ( 03 Mar 2020 04:48 )             30.3     137  |  106  |  67<H>  ----------------------------<  99  4.3   |  17<L>  |  3.15<H>    Ca    8.1<L>      03 Mar 2020 04:48  Phos  3.3     -  Mg     1.7         TPro  6.0  /  Alb  2.4<L>  /  TBili  0.4  /  DBili  x   /  AST  19  /  ALT  9<L>  /  AlkPhos  73        Urinalysis Basic - ( 02 Mar 2020 21:55 )  Color: Dark Yellow / Appearance: Turbid / S.025 / pH: x  Gluc: x / Ketone: Negative  / Bili: Negative / Urobili: Negative   Blood: x / Protein: 100 / Nitrite: Negative   Leuk Esterase: Large / RBC: >50 /hpf / WBC >50 /HPF   Sq Epi: x / Non Sq Epi: 5 / Bacteria: TNTC    RADIOLOGY & ADDITIONAL STUDIES:  Xray Chest 1 View- PORTABLE-Urgent (20 @ 02:31)   IMPRESSION: Clear lungs.    CT Head No Cont (20 @ 23:45)  IMPRESSION: No acute intracranial hemorrhage, territorial infarct, mass effect or displaced calvarial fracture.    PROTEIN CALORIE MALNUTRITION PRESENT: [x]Yes []No  [x]PPSV2 < or = to 30% []significant weight loss  []poor nutritional intake []catabolic state []anasarca     Albumin, Serum: 2.4 g/dL (20 @ 22:37)  Artificial Nutrition []     REFERRALS:   []Chaplaincy  [x]Hospice  []Child Life  [x]Social Work  []Case management []Holistic Therapy

## 2020-03-03 NOTE — CONSULT NOTE ADULT - PROBLEM SELECTOR RECOMMENDATION 2
Now resolved, satting well on NC. Unclear etiology of hypoxia, may be related to encephalopathy.  -c/w supplemental O2, wean as tolerated  -d/c V/Q scan, anticoagulation not within GOC. patient would not tolerate scan as per NM tech

## 2020-03-03 NOTE — CONSULT NOTE ADULT - ATTENDING COMMENTS
I have personally seen and examined this patient and agree with the above assessment and plan, which I have reviewed and edited where appropriate.     GOC: Management of symptoms and all reasonable medical therapies (non-invasive) that may allow patient to recover  Symptoms: Pain, delirum  Disposition: Admitted for acute medical care due to MIRIAM/dehydration - UTI and wound management.  Possible discharge back to nursing home/hospice evaluation.  HCP involved in decision making.  See GOC discussion dated today.

## 2020-03-03 NOTE — CONSULT NOTE ADULT - PROBLEM SELECTOR RECOMMENDATION 7
In addition to the EM visit, an advance care planning meeting was performed  Start time: 1:40PM  End time: 2:00PM  Total time: 20min  A face to face meeting to discuss advance care planning was held today regarding: JACKIE POTTS  Primary decision maker: Patient is unable to participate in decision making  Alternate/surrogate: Gretchen Nick (HCP/Niece)  Discussed advance directives including, but not limited to, healthcare proxy and code status.  Decision regarding code status: DNR/DNI  Documentation completed today: MEG reviewed    See GOC document (3/3) for further details In addition to the EM visit, an advance care planning meeting was performed  Start time: 1:40PM  End time: 2:00PM  Total time: 20min  A face to face meeting to discuss advance care planning was held today regarding: JACKIE POTTS  Primary decision maker: Patient is unable to participate in decision making  Alternate/surrogate: Gretchen Nick (HCP/Niece)  Discussed advance directives including, but not limited to, healthcare proxy and code status.  Decision regarding code status: DNR/DNI  Documentation completed today: MEG reviewed  See GOC document (3/3) for further details

## 2020-03-03 NOTE — H&P ADULT - NSHPPHYSICALEXAM_GEN_ALL_CORE
Vital Signs Last 24 Hrs  T(C): 37.5 (03 Mar 2020 03:34), Max: 39.1 (02 Mar 2020 21:17)  T(F): 99.5 (03 Mar 2020 03:34), Max: 102.3 (02 Mar 2020 21:17)  HR: 116 (03 Mar 2020 03:34) (102 - 116)  BP: 127/98 (03 Mar 2020 03:34) (92/82 - 177/142)  BP(mean): --  RR: 20 (03 Mar 2020 03:34) (20 - 28)  SpO2: 99% (03 Mar 2020 03:34) (88% - 100%)    General: NAD  Head: Normocephalic, Atraumatic, nasal cannula  Eyes: PERRLA, EOMI, normal sclera  Throat: Moist mucous membranes  Respiratory: CTAB, normal respiratory effort, no wheezes, crackles, rales  CV: RRR, S1/S2, no murmurs, rubs or gallops  Abdominal: Soft, bowel sounds present, NT, ND  Extremities: No edema, 2+ DP pulses  Neurological: A&Ox1, MAEx4, non-focal  Skin: No rashes Vital Signs Last 24 Hrs  T(C): 37.5 (03 Mar 2020 03:34), Max: 39.1 (02 Mar 2020 21:17)  T(F): 99.5 (03 Mar 2020 03:34), Max: 102.3 (02 Mar 2020 21:17)  HR: 116 (03 Mar 2020 03:34) (102 - 116)  BP: 127/98 (03 Mar 2020 03:34) (92/82 - 177/142)  BP(mean): --  RR: 20 (03 Mar 2020 03:34) (20 - 28)  SpO2: 99% (03 Mar 2020 03:34) (88% - 100%)    General: NAD, well developed   Head: Normocephalic, Atraumatic, nasal cannula; large lipoma at back of neck   Eyes: PERRLA, normal sclera  Throat: Moist mucous membranes  Respiratory: CTAB, normal respiratory effort, no wheezes, crackles, rales  CV: tachycardic, S1/S2, no murmurs, rubs or gallops  Abdominal: Soft, bowel sounds present, NT, ND  Extremities: No edema, 2+ DP pulse; b/l LEs both wrapped; RLE healing ulcer with eschar and granulation tissue   Neurological: A&Ox1, MAEx4, following simple commands   Skin: RLE foot ulcer, both LEs wrapped

## 2020-03-03 NOTE — H&P ADULT - PROBLEM SELECTOR PLAN 4
Urinalysis in ED with Bacteria TNTC, positive large leukocyte esterase, >50 WBCs, and negative for nitrites.   - continue with IV ceftriaxone for three days  - follow up UCX  - continue to monitor Patient was hypoxic to the 80s in nursing home and ED. Required NRB and BiPAP, now comfortable on 2L NC, saturating %  - unclear etiology - no e/o PNA on CXR, possibly viral vs PE vs 2/2 sepsis  - lower extremity dopplers and VQ scan given persistent tachycardia  - treat infection and continue with supportive care  - wean supplemental oxygen as tolerated

## 2020-03-03 NOTE — H&P ADULT - HISTORY OF PRESENT ILLNESS
89 year old female with PMH uterine cancer, HTN, T2DM, and chronic b/l foot ulcers who presents from [facility] with AMS. Patient poor historian, history obtained from nursing home staff. Baseline mental status is A&Ox3. Yesterday at 4:40 pm the patient started to exhibit signs of confusion and disorientation. She was also hard to arouse. She continued to be confused throughout the evening. She did not complain of any specific symptoms. The patient also experienced a fall yesterday. She was found next to her bed and the cause of the fall was unknown. Stat labs were drawn and she was found with WBC 16 and a positive UTI. She also experienced a fever and hypoxia to the 80s. She was started on zosyn.  Her family member was notified and they wanted her to be sent to [facility] ED.    In the ED, vitals were T102.3, -112, BP /, RR 20-28, and 88% requiring NRB, BiPAP and eventually transitioned to nasal cannula. She received 1L LR bolus x1, 1L NS bolus x1, ceftriaxone 1g x1, toradol 15 mg x1, 650 mg acetaminophen suppository.   BCX and UCX were sent. 89 year old female with PMH uterine cancer, HTN, T2DM, and chronic b/l foot ulcers who presents from [facility] with AMS. Patient poor historian, history obtained from nursing home staff. Baseline mental status is A&Ox3. Yesterday at 4:40 pm the patient started to exhibit signs of confusion and disorientation. She was also hard to arouse. She continued to be confused throughout the evening. She did not complain of any specific symptoms. The patient also experienced a fall yesterday. She was found next to her bed and the cause of the fall was unknown. Stat labs were drawn and she was found with WBC 16 and a positive urinalysis. She also experienced a fever and hypoxia to the 80s. She was started on zosyn.  Her family member was notified and they wanted her to be sent to [facility] ED.    In the ED, vitals were T102.3, -112, BP /, RR 20-28, and 88% requiring NRB, BiPAP and eventually transitioned to nasal cannula. She received 1L LR bolus x1, 1L NS bolus x1, ceftriaxone 1g x1, toradol 15 mg x1, 650 mg acetaminophen suppository.   BCX and UCX were sent.

## 2020-03-03 NOTE — CONSULT NOTE ADULT - ASSESSMENT
89 yo F presents with bilateral extensive foot wounds  - pt was seen and evaluated   - WBC 11, afebrile   - left hallux wound to bone with exposed IP joint and necrotic bone, malodorous. Left plantar heel dry stable eschar. Right medial ankle wound to subQ, lateral heel wound to bone with fibrotic tissue and malodor. Excisional sharp debridement performed to the right heel to remove the fibrotic tissue to the level of bone, not pass it  - culture taken from the left hallux wound   - ordered bl foot X-ray and right ankle X-ray   - ordered ESR, CRP and RAMIRO  - will follow   - d/w attending

## 2020-03-03 NOTE — CONSULT NOTE ADULT - PROBLEM SELECTOR RECOMMENDATION 9
In the setting of chronic lower extremity wounds. Patient taking Oxy 5mg PO x1 daily PTA.  -Morphine Solution 5mg PO q6h PRN for Pain/Wound Care

## 2020-03-03 NOTE — H&P ADULT - PROBLEM SELECTOR PLAN 5
Patient with BUN 66 and Scr 3.01. Scr was 0.83 in November 2019.  - likely prerenal 2/2 sepsis  - will order urine electrolytes  - bladder scan to assess for retention  - trend BMP daily  - avoid nephrotoxins and renally dose medications Patient with BUN 66 and Scr 3.01. Scr was 0.83 in November 2019.  - likely prerenal 2/2 sepsis  - will order urine electrolytes  - bladder scan to assess for retention  - continue with IVFs  - trend BMP daily  - avoid nephrotoxins and renally dose medications Urinalysis in ED with Bacteria TNTC, positive large leukocyte esterase, >50 WBCs, and negative for nitrites.   - continue with IV ceftriaxone for three days  - follow up UCX  - continue to monitor

## 2020-03-03 NOTE — H&P ADULT - PROBLEM SELECTOR PLAN 3
Patient was hypoxic to the 80s in nursing home and ED. Required NRB and BiPAP, now on 2L NC.  - likely 2/2 sepsis  - treat infection and continue with supportive care  - wean supplemental oxygen as tolerated Patient was hypoxic to the 80s in nursing home and ED. Required NRB and BiPAP, now on 2L NC.  - likely 2/2 sepsis  - lower extremity dopplers and VQ scan given persistent tachycardia  - treat infection and continue with supportive care  - wean supplemental oxygen as tolerated Patient with BUN 66 and Scr 3.01. Scr was 0.83 in November 2019.  - likely prerenal 2/2 sepsis  - will order urine electrolytes  - bladder scan to assess for retention  - continue with IVFs  - trend BMP daily  - avoid nephrotoxins and renally dose medications

## 2020-03-03 NOTE — PATIENT PROFILE ADULT - VISION (WITH CORRECTIVE LENSES IF THE PATIENT USUALLY WEARS THEM):
macular degeneration/Partially impaired: cannot see medication labels or newsprint, but can see obstacles in path, and the surrounding layout; can count fingers at arm's length

## 2020-03-03 NOTE — CHART NOTE - NSCHARTNOTEFT_GEN_A_CORE
patient examined with bedside RN  bilateral unstageable pressure ulcers both heels, right medial malleolus and left 1st toe - foul smelling  DTI sacrum  patient lethargic - groans when turned on side  mouth breathing  stable vitals  attempted twice, unable to reach Niece  NPO; switch to D5NS  Is/Os  wound care and pall care eval pending.  lower gabapentin dose to bedtime only for now.  DNR DNI  continue rocephin for UTI and f/up cultures  bedbound patient from NH  plan of care discussed with medicine NP     Alonzo Almeida MD, MHA, FACP, [facility]  Pager: [phone]  If no response or off-hours, page [phone]

## 2020-03-03 NOTE — H&P ADULT - ASSESSMENT
89 year old female with PMH uterine cancer, HTN, T2DM, and chronic b/l foot ulcers who presents from [facility] with AMS, was septic on admission with positive urinalysis, admitted for further management. 89 year old female with PMH uterine cancer, HTN, T2DM, and chronic b/l foot ulcers who presents from [facility] with AMS, was septic on admission with positive urinalysis, admitted for encephalopathy and sepsis 2/2 UTI.

## 2020-03-04 DIAGNOSIS — N17.9 ACUTE KIDNEY FAILURE, UNSPECIFIED: ICD-10-CM

## 2020-03-04 DIAGNOSIS — M86.9 OSTEOMYELITIS, UNSPECIFIED: ICD-10-CM

## 2020-03-04 LAB
ANION GAP SERPL CALC-SCNC: 15 MMOL/L — SIGNIFICANT CHANGE UP (ref 5–17)
ANISOCYTOSIS BLD QL: SLIGHT — SIGNIFICANT CHANGE UP
BASOPHILS # BLD AUTO: 0 K/UL — SIGNIFICANT CHANGE UP (ref 0–0.2)
BASOPHILS NFR BLD AUTO: 0 % — SIGNIFICANT CHANGE UP (ref 0–2)
BUN SERPL-MCNC: 82 MG/DL — HIGH (ref 7–23)
BURR CELLS BLD QL SMEAR: PRESENT — SIGNIFICANT CHANGE UP
CALCIUM SERPL-MCNC: 8.1 MG/DL — LOW (ref 8.4–10.5)
CHLORIDE SERPL-SCNC: 107 MMOL/L — SIGNIFICANT CHANGE UP (ref 96–108)
CO2 SERPL-SCNC: 17 MMOL/L — LOW (ref 22–31)
CREAT SERPL-MCNC: 4.23 MG/DL — HIGH (ref 0.5–1.3)
EOSINOPHIL # BLD AUTO: 0.08 K/UL — SIGNIFICANT CHANGE UP (ref 0–0.5)
EOSINOPHIL NFR BLD AUTO: 0.9 % — SIGNIFICANT CHANGE UP (ref 0–6)
GLUCOSE BLDC GLUCOMTR-MCNC: 153 MG/DL — HIGH (ref 70–99)
GLUCOSE BLDC GLUCOMTR-MCNC: 155 MG/DL — HIGH (ref 70–99)
GLUCOSE SERPL-MCNC: 172 MG/DL — HIGH (ref 70–99)
HCT VFR BLD CALC: 31.3 % — LOW (ref 34.5–45)
HGB BLD-MCNC: 9.1 G/DL — LOW (ref 11.5–15.5)
LYMPHOCYTES # BLD AUTO: 0.37 K/UL — LOW (ref 1–3.3)
LYMPHOCYTES # BLD AUTO: 4.3 % — LOW (ref 13–44)
MACROCYTES BLD QL: SLIGHT — SIGNIFICANT CHANGE UP
MANUAL SMEAR VERIFICATION: SIGNIFICANT CHANGE UP
MCHC RBC-ENTMCNC: 25.2 PG — LOW (ref 27–34)
MCHC RBC-ENTMCNC: 29.1 GM/DL — LOW (ref 32–36)
MCV RBC AUTO: 86.7 FL — SIGNIFICANT CHANGE UP (ref 80–100)
MONOCYTES # BLD AUTO: 0.3 K/UL — SIGNIFICANT CHANGE UP (ref 0–0.9)
MONOCYTES NFR BLD AUTO: 3.5 % — SIGNIFICANT CHANGE UP (ref 2–14)
NEUTROPHILS # BLD AUTO: 7.81 K/UL — HIGH (ref 1.8–7.4)
NEUTROPHILS NFR BLD AUTO: 91.3 % — HIGH (ref 43–77)
NRBC # BLD: 1 /100 — HIGH (ref 0–0)
OVALOCYTES BLD QL SMEAR: SLIGHT — SIGNIFICANT CHANGE UP
PLAT MORPH BLD: NORMAL — SIGNIFICANT CHANGE UP
PLATELET # BLD AUTO: 129 K/UL — LOW (ref 150–400)
POIKILOCYTOSIS BLD QL AUTO: SIGNIFICANT CHANGE UP
POTASSIUM SERPL-MCNC: 4.6 MMOL/L — SIGNIFICANT CHANGE UP (ref 3.5–5.3)
POTASSIUM SERPL-SCNC: 4.6 MMOL/L — SIGNIFICANT CHANGE UP (ref 3.5–5.3)
RBC # BLD: 3.61 M/UL — LOW (ref 3.8–5.2)
RBC # FLD: 18.3 % — HIGH (ref 10.3–14.5)
RBC BLD AUTO: ABNORMAL
SCHISTOCYTES BLD QL AUTO: SLIGHT — SIGNIFICANT CHANGE UP
SODIUM SERPL-SCNC: 139 MMOL/L — SIGNIFICANT CHANGE UP (ref 135–145)
WBC # BLD: 8.55 K/UL — SIGNIFICANT CHANGE UP (ref 3.8–10.5)
WBC # FLD AUTO: 8.55 K/UL — SIGNIFICANT CHANGE UP (ref 3.8–10.5)

## 2020-03-04 PROCEDURE — 99233 SBSQ HOSP IP/OBS HIGH 50: CPT | Mod: GC

## 2020-03-04 RX ORDER — HYDROMORPHONE HYDROCHLORIDE 2 MG/ML
0.25 INJECTION INTRAMUSCULAR; INTRAVENOUS; SUBCUTANEOUS
Refills: 0 | Status: DISCONTINUED | OUTPATIENT
Start: 2020-03-04 | End: 2020-03-06

## 2020-03-04 RX ADMIN — Medication 1 APPLICATION(S): at 12:36

## 2020-03-04 RX ADMIN — HYDROMORPHONE HYDROCHLORIDE 0.25 MILLIGRAM(S): 2 INJECTION INTRAMUSCULAR; INTRAVENOUS; SUBCUTANEOUS at 15:35

## 2020-03-04 RX ADMIN — PIPERACILLIN AND TAZOBACTAM 25 GRAM(S): 4; .5 INJECTION, POWDER, LYOPHILIZED, FOR SOLUTION INTRAVENOUS at 05:05

## 2020-03-04 RX ADMIN — HEPARIN SODIUM 5000 UNIT(S): 5000 INJECTION INTRAVENOUS; SUBCUTANEOUS at 14:30

## 2020-03-04 RX ADMIN — SODIUM CHLORIDE 75 MILLILITER(S): 9 INJECTION, SOLUTION INTRAVENOUS at 08:01

## 2020-03-04 RX ADMIN — Medication 650 MILLIGRAM(S): at 15:30

## 2020-03-04 RX ADMIN — Medication 650 MILLIGRAM(S): at 14:32

## 2020-03-04 RX ADMIN — MORPHINE SULFATE 5 MILLIGRAM(S): 50 CAPSULE, EXTENDED RELEASE ORAL at 13:00

## 2020-03-04 RX ADMIN — HEPARIN SODIUM 5000 UNIT(S): 5000 INJECTION INTRAVENOUS; SUBCUTANEOUS at 21:19

## 2020-03-04 RX ADMIN — HYDROMORPHONE HYDROCHLORIDE 0.25 MILLIGRAM(S): 2 INJECTION INTRAMUSCULAR; INTRAVENOUS; SUBCUTANEOUS at 15:18

## 2020-03-04 RX ADMIN — Medication 1: at 08:01

## 2020-03-04 RX ADMIN — PIPERACILLIN AND TAZOBACTAM 25 GRAM(S): 4; .5 INJECTION, POWDER, LYOPHILIZED, FOR SOLUTION INTRAVENOUS at 18:17

## 2020-03-04 RX ADMIN — Medication 1 APPLICATION(S): at 12:37

## 2020-03-04 RX ADMIN — HEPARIN SODIUM 5000 UNIT(S): 5000 INJECTION INTRAVENOUS; SUBCUTANEOUS at 05:05

## 2020-03-04 RX ADMIN — SODIUM CHLORIDE 75 MILLILITER(S): 9 INJECTION, SOLUTION INTRAVENOUS at 21:19

## 2020-03-04 RX ADMIN — Medication 0.25 MILLIGRAM(S): at 15:28

## 2020-03-04 RX ADMIN — MORPHINE SULFATE 5 MILLIGRAM(S): 50 CAPSULE, EXTENDED RELEASE ORAL at 12:36

## 2020-03-04 NOTE — PROGRESS NOTE ADULT - PROBLEM SELECTOR PLAN 2
Now resolved, satting well on NC. Unclear etiology of hypoxia, may be related to encephalopathy.  -c/w supplemental O2, wean as tolerated  -no further workup as per HCP Now resolved, satting well on NC. Unclear etiology of hypoxia, may be related to metabolic encephalopathy.  -c/w supplemental O2, wean as tolerated  -no further workup as per HCP

## 2020-03-04 NOTE — PROGRESS NOTE ADULT - PROBLEM SELECTOR PLAN 7
n the setting of UTI, renal failure, LE wound infection/osteo  -CT Head w/o acute pathology  -c/w supportive care

## 2020-03-04 NOTE — PROGRESS NOTE ADULT - PROBLEM SELECTOR PLAN 9
Discussed lab and XR findings with family at bedside. They would like to give the patient a few days of Abx and IVF to give her an opportunity to recover and potentially return to her NH. If the patient is not recovering over the weekend, they are amenable to an entirely symptom-directed focus and would likely d/c Abx and IVF.    See GO Document from 3/3.

## 2020-03-04 NOTE — PROGRESS NOTE ADULT - ASSESSMENT
89yo F with PMH of Uterine CA (dx 2019, untreated), T2DM, HTN, and Chronic b/l Foot Ulcers p/w AMS in the setting of UTI +/- wound infection. Palliative consulted for GOC, transferred to PCU service. 89yo F with PMH of Uterine CA (dx 2019, untreated), T2DM, HTN, and Chronic b/l Foot Ulcers p/w AMS in the setting of UTI +/- wound infection. [facility] team consulted for GOC, transferred to our service in the PCU.

## 2020-03-04 NOTE — PROGRESS NOTE ADULT - PROBLEM SELECTOR PLAN 5
In the setting of infection and poor PO intake  -c/w IVF for now  -will check labs every other day to guide prognosis

## 2020-03-04 NOTE — PROGRESS NOTE ADULT - PROBLEM SELECTOR PLAN 3
Noted on XR in the setting of chronic wounds  -c/w Zosyn for now, f/u wound CX's  -family would not be accepting of surgical intervention if warranted  -appreciate Podiatry input

## 2020-03-04 NOTE — PROGRESS NOTE ADULT - SUBJECTIVE AND OBJECTIVE BOX
GAP TEAM PALLIATIVE CARE UNIT PROGRESS NOTE:      [  ] Patient on hospice program.    INDICATION FOR PALLIATIVE CARE UNIT SERVICES: Goals of Care in the setting of Advanced Illness and Untreated Malignancy    INTERVAL HPI/OVERNIGHT EVENTS: Patient was noted to be restless overnight, recurrent issue with delirium. This AM, patient appears calm and comfortable. She states her pain is tolerable. She is tolerating her diet and has had appropriate ostomy output. Required PRNs of Dilaudid x4 and Ativan x2 in past 24hrs.     DNR on chart: Yes    ALLERGIES:  No Known Allergies    Intolerances    MEDICATIONS  (STANDING):  collagenase Ointment 1 Application(s) Topical daily  Dakins Solution - 1/4 Strength 1 Application(s) Topical daily  dextrose 5% + sodium chloride 0.9%. 1000 milliLiter(s) (75 mL/Hr) IV Continuous <Continuous>  gabapentin 400 milliGRAM(s) Oral at bedtime  heparin  Injectable 5000 Unit(s) SubCutaneous every 8 hours  melatonin 5 milliGRAM(s) Oral at bedtime  mirtazapine 7.5 milliGRAM(s) Oral at bedtime  pantoprazole    Tablet 40 milliGRAM(s) Oral before breakfast  piperacillin/tazobactam IVPB.. 3.375 Gram(s) IV Intermittent every 12 hours  predniSONE   Tablet 5 milliGRAM(s) Oral daily    MEDICATIONS  (PRN):  acetaminophen  Suppository .. 650 milliGRAM(s) Rectal every 6 hours PRN Temp greater or equal to 38C (100.4F)  bisacodyl Suppository 10 milliGRAM(s) Rectal daily PRN Constipation  LORazepam   Injectable 0.25 milliGRAM(s) IV Push every 4 hours PRN Agitation  morphine Concentrate 5 milliGRAM(s) Oral every 6 hours PRN Pain    ITEMS UNCHECKED ARE NOT PRESENT    PRESENT SYMPTOMS: [ ]Unable to obtain due to poor mentation   Source if other than patient:  [ ]Family   [ ]Team     Pain: [ ] yes [ ] no  QOL impact -   Location -                    Aggravating factors -  Quality -  Radiation -  Timing-  Severity (0-10 scale):  Minimal acceptable level (0-10 scale):     Dyspnea:                           [ ]Mild [ ]Moderate [ ]Severe  Anxiety:                             [ ]Mild [ ]Moderate [ ]Severe  Fatigue:                             [ ]Mild [ ]Moderate [ ]Severe  Nausea:                             [ ]Mild [ ]Moderate [ ]Severe  Loss of appetite:              [ ]Mild [ ]Moderate [ ]Severe  Constipation:                    [ ]Mild [ ]Moderate [ ]Severe    PAINAD Score:    [email or web address]/cog/painad.pdf (Ctrl +  left click to view)  		  Other Symptoms:  [x]All other review of systems negative     Palliative Performance Status Version 2:         %         [email or web address]    PHYSICAL EXAM:  Vital Signs Last 24 Hrs  T(C): 36.6 (04 Mar 2020 07:53), Max: 38 (03 Mar 2020 17:01)  T(F): 97.8 (04 Mar 2020 07:53), Max: 100.4 (03 Mar 2020 17:01)  HR: 90 (04 Mar 2020 07:53) (90 - 93)  BP: 148/63 (04 Mar 2020 07:53) (96/46 - 148/63)  BP(mean): --  RR: 22 (04 Mar 2020 07:53) (20 - 22)  SpO2: 90% (04 Mar 2020 07:53) (90% - 97%) I&O's Summary    GENERAL:  [ ]Alert  [ ]Oriented x   [ ]Lethargic  [ ]Cachexia  [ ]Unarousable  [ ]Verbal  [ ]Non-Verbal  Behavioral:   [ ] Anxiety  [ ] Delirium [ ] Agitation [ ] Other  HEENT:  [ ]Normal   [ ]Dry mouth   [ ]ET Tube/Trach  [ ]Oral lesions  PULMONARY:   [x]Clear [ ]Tachypnea  [ ]Audible excessive secretions   [ ]Rhonchi        [ ]Right [ ]Left [ ]Bilateral  [ ]Crackles        [ ]Right [ ]Left [ ]Bilateral  [ ]Wheezing     [ ]Right [ ]Left [ ]Bilateral  [ ]Diminshed BS [ ]Right [ ]Left [ ]Bilateral    CARDIOVASCULAR:    [ ]Regular [ ]Irregular [ ]Tachy  [ ]Michael [ ]Murmur [ ]Other  GASTROINTESTINAL:  [ ]Soft  [ ]Distended   [ ]+BS  [ ]Non tender [ ]Tender  [ ]PEG [ ]OGT/ NGT   Last BM:     GENITOURINARY:  [ ]Normal [ ] Incontinent   [ ]Oliguria/Anuria   [ ]Soler  MUSCULOSKELETAL:   [ ]Normal   [ ]Weakness  [x]Bed/Wheelchair bound [ ]Edema  NEUROLOGIC:   [ ]No focal deficits  [ ] Cognitive impairment  [ ] Dysphagia [ ]Dysarthria [ ] Paresis [ ]Other   SKIN:   [ ]Normal  [ ]Rash     [ ]Pressure ulcer(s)  [ ]y [ ]n  Present on admission      CRITICAL CARE:  [ ] Shock Present  [ ]Septic [ ]Cardiogenic [ ]Neurologic [ ]Hypovolemic  [ ]  Vasopressors [ ]  Inotropes   [ ] Respiratory failure present [ ] Mechanical Ventilation [ ] Non-invasive ventilatory support [ ] High-Flow  [ ] Acute  [ ] Chronic [ ] Hypoxic  [ ] Hypercarbic [ ] Other  [ ] Other organ failure     LABS:                        9.1    8.55  )-----------( 129      ( 04 Mar 2020 06:20 )             31.3   03-04    139  |  107  |  82<H>  ----------------------------<  172<H>  4.6   |  17<L>  |  4.23<H>    Ca    8.1<L>      04 Mar 2020 06:20  Phos  3.3     03-03  Mg     1.7     03-03    TPro  6.0  /  Alb  2.4<L>  /  TBili  0.4  /  DBili  x   /  AST  19  /  ALT  9<L>  /  AlkPhos  73  03-02      Urinalysis Basic - ( 02 Mar 2020 21:55 )    Color: Dark Yellow / Appearance: Turbid / S.025 / pH: x  Gluc: x / Ketone: Negative  / Bili: Negative / Urobili: Negative   Blood: x / Protein: 100 / Nitrite: Negative   Leuk Esterase: Large / RBC: >50 /hpf / WBC >50 /HPF   Sq Epi: x / Non Sq Epi: 5 / Bacteria: TNTC      RADIOLOGY & ADDITIONAL STUDIES:    PROTEIN CALORIE MALNUTRITION: [ ] mild [ ] moderate [ ] severe  [ ] underweight [ ] morbid obesity  https://www.andeal.org/vault/2440/web/files/ONC/Table_Clinical%20Characteristics%20to%20Document%20Malnutrition-White%20JV%20et%20al%2020.pdf  Height (cm): 157.5 (19 @ 03:36)  Weight (kg): 90.9 (19 @ 22:27)  BMI (kg/m2): 36.6 (19 @ 03:36)    [ ] PPSV2 < or = 30% [ ] significant weight loss [ ] poor nutritional intake [ ] anasarca  [ ] Artificial Nutrition Albumin, Serum: 2.4 g/dL (20 @ 22:37)      REFERRALS:   [ ]Chaplaincy  [ ] Hospice  [ ]Child Life  [ ]Social Work  [ ]Case management [ ]Holistic Therapy [ ] Physical Therapy [ ] Dietary    Goals of Care Document: GAP TEAM PALLIATIVE CARE UNIT PROGRESS NOTE:      [  ] Patient on hospice program.    INDICATION FOR PALLIATIVE CARE UNIT SERVICES: Goals of Care in the setting of Advanced Illness and Untreated Malignancy    INTERVAL HPI/OVERNIGHT EVENTS: Patient was noted to be restless overnight, recurrent issue with delirium. This AM, patient appears calm and comfortable. She states her pain is tolerable. She is tolerating her diet and has had appropriate ostomy output. Required PRNs of Dilaudid x4 and Ativan x2 in past 24hrs.     DNR on chart: Yes    ALLERGIES:  No Known Allergies    Intolerances    MEDICATIONS  (STANDING):  collagenase Ointment 1 Application(s) Topical daily  Dakins Solution - 1/4 Strength 1 Application(s) Topical daily  dextrose 5% + sodium chloride 0.9%. 1000 milliLiter(s) (75 mL/Hr) IV Continuous <Continuous>  gabapentin 400 milliGRAM(s) Oral at bedtime  heparin  Injectable 5000 Unit(s) SubCutaneous every 8 hours  melatonin 5 milliGRAM(s) Oral at bedtime  mirtazapine 7.5 milliGRAM(s) Oral at bedtime  pantoprazole    Tablet 40 milliGRAM(s) Oral before breakfast  piperacillin/tazobactam IVPB.. 3.375 Gram(s) IV Intermittent every 12 hours  predniSONE   Tablet 5 milliGRAM(s) Oral daily    MEDICATIONS  (PRN):  acetaminophen  Suppository .. 650 milliGRAM(s) Rectal every 6 hours PRN Temp greater or equal to 38C (100.4F)  bisacodyl Suppository 10 milliGRAM(s) Rectal daily PRN Constipation  LORazepam   Injectable 0.25 milliGRAM(s) IV Push every 4 hours PRN Agitation  morphine Concentrate 5 milliGRAM(s) Oral every 6 hours PRN Pain    ITEMS UNCHECKED ARE NOT PRESENT    PRESENT SYMPTOMS: [x]Unable to obtain due to poor mentation   Source if other than patient:  [ ]Family   [ ]Team     Pain: [x] yes [ ] no - See PAINAD  QOL impact -    Location -                    Aggravating factors -  Quality -  Radiation -  Timing-  Severity (0-10 scale):  Minimal acceptable level (0-10 scale):     Dyspnea:                           [ ]Mild [ ]Moderate [ ]Severe  Anxiety:                             [ ]Mild [ ]Moderate [ ]Severe  Fatigue:                             [ ]Mild [ ]Moderate [ ]Severe  Nausea:                             [ ]Mild [ ]Moderate [ ]Severe  Loss of appetite:              [ ]Mild [ ]Moderate [ ]Severe  Constipation:                    [ ]Mild [ ]Moderate [ ]Severe    PAINAD Score: 2  [email or web address].[email or web address]/painad.pdf (Ctrl +  left click to view)  		  Other Symptoms:  [x]All other review of systems negative     Palliative Performance Status Version 2:         20%         [email or web address]    PHYSICAL EXAM:  Vital Signs Last 24 Hrs  T(C): 36.6 (04 Mar 2020 07:53), Max: 38 (03 Mar 2020 17:01)  T(F): 97.8 (04 Mar 2020 07:53), Max: 100.4 (03 Mar 2020 17:01)  HR: 90 (04 Mar 2020 07:53) (90 - 93)  BP: 148/63 (04 Mar 2020 07:53) (96/46 - 148/63)  RR: 22 (04 Mar 2020 07:53) (20 - 22)  SpO2: 90% (04 Mar 2020 07:53) (90% - 97%) I&O's Summary    GENERAL:  [ ]Alert  [ ]Oriented x   [x]Lethargic  [ ]Cachexia  [ ]Unarousable  [ ]Verbal  [x]Non-Verbal  Behavioral:   [ ] Anxiety  [x] Delirium [ ] Agitation [ ] Other  HEENT:  [ ]Normal   [x]Dry mouth   [ ]ET Tube/Trach  [ ]Oral lesions  PULMONARY:   [x]Clear [ ]Tachypnea  [ ]Audible excessive secretions   [ ]Rhonchi        [ ]Right [ ]Left [ ]Bilateral  [ ]Crackles        [ ]Right [ ]Left [ ]Bilateral  [ ]Wheezing     [ ]Right [ ]Left [ ]Bilateral  [x]Diminshed BS [ ]Right [ ]Left [x]Bilateral    CARDIOVASCULAR:    [x]Regular [ ]Irregular [ ]Tachy  [ ]Michael [ ]Murmur [ ]Other  GASTROINTESTINAL:  [x]Soft  [ ]Distended   [x]+BS  [x]Non tender [ ]Tender  [ ]PEG [ ]OGT/ NGT   Last BM:?     GENITOURINARY:  [ ]Normal [ ] Incontinent   [x]Oliguria/Anuria   [ ]Soler  MUSCULOSKELETAL:   [ ]Normal   [ ]Weakness  [x]Bed/Wheelchair bound [ ]Edema  NEUROLOGIC:   [ ]No focal deficits  [x] Cognitive impairment  [ ] Dysphagia [ ]Dysarthria [ ] Paresis [ ]Other   SKIN:   [ ]Normal  [ ]Rash     [x]Pressure ulcer(s)  [x]y [ ]n  Present on admission - bilateral heels    CRITICAL CARE:  [ ] Shock Present  [ ]Septic [ ]Cardiogenic [ ]Neurologic [ ]Hypovolemic  [ ]  Vasopressors [ ]  Inotropes   [ ] Respiratory failure present [ ] Mechanical Ventilation [ ] Non-invasive ventilatory support [ ] High-Flow  [ ] Acute  [ ] Chronic [ ] Hypoxic  [ ] Hypercarbic [ ] Other  [x] Other organ failure: Renal    LABS:                        9.1    8.55  )-----------( 129      ( 04 Mar 2020 06:20 )             31.3     139  |  107  |  82<H>  ----------------------------<  172<H>  4.6   |  17<L>  |  4.23<H>    Ca    8.1<L>      04 Mar 2020 06:20  Phos  3.3     03-03  Mg     1.7     03-03    TPro  6.0  /  Alb  2.4<L>  /  TBili  0.4  /  DBili  x   /  AST  19  /  ALT  9<L>  /  AlkPhos  73  03-02      RADIOLOGY & ADDITIONAL STUDIES:  Xray Ankle Complete 3 Views, Right (03.03.20 @ 16:23)  Impression: Posttraumatic and degenerative changes as above. No definite radiographic evidence of osteomyelitis. However, MRI can be performed over the exact site of concern if there is persistent clinical suspicion for osteomyelitis.    Xray Foot AP + Lateral + Oblique, Bilat (03.03.20 @ 16:23)  Left foot: Loss of the soft tissues medially adjacent to the first interphalangeal joint with adjacent osseous erosion consistent with septicarthritis/osteomyelitis at the distal aspect of the first proximal phalanx and medial base of the first distal phalanx. Additional erosive changes as above which are age indeterminate; correlate for any history of an erosive arthropathy or previous osteomyelitis.    PROTEIN CALORIE MALNUTRITION: [ ] mild [ ] moderate [ ] severe  [ ] underweight [ ] morbid obesity  https://www.andeal.org/vault/3840/web/files/ONC/Table_Clinical%20Characteristics%20to%20Document%20Malnutrition-White%20JV%20et%20al%202012.pdf  Height (cm): 157.5 (11-07-19 @ 03:36)  Weight (kg): 90.9 (11-06-19 @ 22:27)  BMI (kg/m2): 36.6 (11-07-19 @ 03:36)    [x] PPSV2 < or = 30% [ ] significant weight loss [x] poor nutritional intake [ ] anasarca  [ ] Artificial Nutrition Albumin, Serum: 2.4 g/dL (03-02-20 @ 22:37)      REFERRALS:   [ ]Chaplaincy  [ ] Hospice  [ ]Child Life  [x]Social Work  [ ]Case management [ ]Holistic Therapy [ ] Physical Therapy [ ] Dietary GAP TEAM PALLIATIVE CARE UNIT PROGRESS NOTE:      [  ] Patient on hospice program.    INDICATION FOR PALLIATIVE CARE UNIT SERVICES: Goals of Care in the setting of Advanced Illness and Untreated Malignancy    INTERVAL HPI/OVERNIGHT EVENTS: Patient was noted to be recurrently febrile. Minimally responsive to stimuli this AM and not able to interact.    DNR on chart: Yes    ALLERGIES:  No Known Allergies    Intolerances    MEDICATIONS  (STANDING):  collagenase Ointment 1 Application(s) Topical daily  Dakins Solution - 1/4 Strength 1 Application(s) Topical daily  dextrose 5% + sodium chloride 0.9%. 1000 milliLiter(s) (75 mL/Hr) IV Continuous <Continuous>  gabapentin 400 milliGRAM(s) Oral at bedtime  heparin  Injectable 5000 Unit(s) SubCutaneous every 8 hours  melatonin 5 milliGRAM(s) Oral at bedtime  mirtazapine 7.5 milliGRAM(s) Oral at bedtime  pantoprazole    Tablet 40 milliGRAM(s) Oral before breakfast  piperacillin/tazobactam IVPB.. 3.375 Gram(s) IV Intermittent every 12 hours  predniSONE   Tablet 5 milliGRAM(s) Oral daily    MEDICATIONS  (PRN):  acetaminophen  Suppository .. 650 milliGRAM(s) Rectal every 6 hours PRN Temp greater or equal to 38C (100.4F)  bisacodyl Suppository 10 milliGRAM(s) Rectal daily PRN Constipation  LORazepam   Injectable 0.25 milliGRAM(s) IV Push every 4 hours PRN Agitation  morphine Concentrate 5 milliGRAM(s) Oral every 6 hours PRN Pain    ITEMS UNCHECKED ARE NOT PRESENT    PRESENT SYMPTOMS: [x]Unable to obtain due to poor mentation   Source if other than patient:  [ ]Family   [ ]Team     Pain: [x] yes [ ] no - See PAINAD  QOL impact -    Location -                    Aggravating factors -  Quality -  Radiation -  Timing-  Severity (0-10 scale):  Minimal acceptable level (0-10 scale):     Dyspnea:                           [ ]Mild [ ]Moderate [ ]Severe  Anxiety:                             [ ]Mild [ ]Moderate [ ]Severe  Fatigue:                             [ ]Mild [ ]Moderate [ ]Severe  Nausea:                             [ ]Mild [ ]Moderate [ ]Severe  Loss of appetite:              [ ]Mild [ ]Moderate [ ]Severe  Constipation:                    [ ]Mild [ ]Moderate [ ]Severe    PAINAD Score: 2  [email or web address].[email or web address]/cog/painad.pdf (Ctrl +  left click to view)  		  Other Symptoms:  [x]All other review of systems negative     Palliative Performance Status Version 2:         20%         [email or web address]/[email or web address]    PHYSICAL EXAM:  Vital Signs Last 24 Hrs  T(C): 36.6 (04 Mar 2020 07:53), Max: 38 (03 Mar 2020 17:01)  T(F): 97.8 (04 Mar 2020 07:53), Max: 100.4 (03 Mar 2020 17:01)  HR: 90 (04 Mar 2020 07:53) (90 - 93)  BP: 148/63 (04 Mar 2020 07:53) (96/46 - 148/63)  RR: 22 (04 Mar 2020 07:53) (20 - 22)  SpO2: 90% (04 Mar 2020 07:53) (90% - 97%) I&O's Summary    GENERAL:  [ ]Alert  [ ]Oriented x   [x]Lethargic  [ ]Cachexia  [ ]Unarousable  [ ]Verbal  [x]Non-Verbal  Behavioral:   [ ] Anxiety  [x] Delirium [ ] Agitation [ ] Other  HEENT:  [ ]Normal   [x]Dry mouth   [ ]ET Tube/Trach  [ ]Oral lesions  PULMONARY:   [x]Clear [ ]Tachypnea  [ ]Audible excessive secretions   [ ]Rhonchi        [ ]Right [ ]Left [ ]Bilateral  [ ]Crackles        [ ]Right [ ]Left [ ]Bilateral  [ ]Wheezing     [ ]Right [ ]Left [ ]Bilateral  [x]Diminshed BS [ ]Right [ ]Left [x]Bilateral    CARDIOVASCULAR:    [x]Regular [ ]Irregular [ ]Tachy  [ ]Michael [ ]Murmur [ ]Other  GASTROINTESTINAL:  [x]Soft  [ ]Distended   [x]+BS  [x]Non tender [ ]Tender  [ ]PEG [ ]OGT/ NGT   Last BM:?     GENITOURINARY:  [ ]Normal [ ] Incontinent   [x]Oliguria/Anuria   [ ]Soler  MUSCULOSKELETAL:   [ ]Normal   [ ]Weakness  [x]Bed/Wheelchair bound [ ]Edema  NEUROLOGIC:   [ ]No focal deficits  [x] Cognitive impairment  [ ] Dysphagia [ ]Dysarthria [ ] Paresis [ ]Other   SKIN:   [ ]Normal  [ ]Rash     [x]Pressure ulcer(s)  [x]y [ ]n  Present on admission - bilateral heels    CRITICAL CARE:  [ ] Shock Present  [ ]Septic [ ]Cardiogenic [ ]Neurologic [ ]Hypovolemic  [ ]  Vasopressors [ ]  Inotropes   [ ] Respiratory failure present [ ] Mechanical Ventilation [ ] Non-invasive ventilatory support [ ] High-Flow  [ ] Acute  [ ] Chronic [ ] Hypoxic  [ ] Hypercarbic [ ] Other  [x] Other organ failure: Renal    LABS:                        9.1    8.55  )-----------( 129      ( 04 Mar 2020 06:20 )             31.3     139  |  107  |  82<H>  ----------------------------<  172<H>  4.6   |  17<L>  |  4.23<H>    Ca    8.1<L>      04 Mar 2020 06:20  Phos  3.3     03-03  Mg     1.7     03-03    TPro  6.0  /  Alb  2.4<L>  /  TBili  0.4  /  DBili  x   /  AST  19  /  ALT  9<L>  /  AlkPhos  73  03-02      RADIOLOGY & ADDITIONAL STUDIES:  Xray Ankle Complete 3 Views, Right (03.03.20 @ 16:23)  Impression: Posttraumatic and degenerative changes as above. No definite radiographic evidence of osteomyelitis. However, MRI can be performed over the exact site of concern if there is persistent clinical suspicion for osteomyelitis.    Xray Foot AP + Lateral + Oblique, Bilat (03.03.20 @ 16:23)  Left foot: Loss of the soft tissues medially adjacent to the first interphalangeal joint with adjacent osseous erosion consistent with septicarthritis/osteomyelitis at the distal aspect of the first proximal phalanx and medial base of the first distal phalanx. Additional erosive changes as above which are age indeterminate; correlate for any history of an erosive arthropathy or previous osteomyelitis.    PROTEIN CALORIE MALNUTRITION: [ ] mild [ ] moderate [ ] severe  [ ] underweight [ ] morbid obesity  https://www.andeal.org/vault/2440/web/files/ONC/Table_Clinical%20Characteristics%20to%20Document%20Malnutrition-White%20JV%20et%20al%202012.pdf  Height (cm): 157.5 (11-07-19 @ 03:36)  Weight (kg): 90.9 (11-06-19 @ 22:27)  BMI (kg/m2): 36.6 (11-07-19 @ 03:36)    [x] PPSV2 < or = 30% [ ] significant weight loss [x] poor nutritional intake [ ] anasarca  [ ] Artificial Nutrition Albumin, Serum: 2.4 g/dL (03-02-20 @ 22:37)      REFERRALS:   [ ]Chaplaincy  [ ] Hospice  [ ]Child Life  [x]Social Work  [ ]Case management [ ]Holistic Therapy [ ] Physical Therapy [ ] Dietary GAP TEAM PALLIATIVE CARE UNIT PROGRESS NOTE:      [  ] Patient on hospice program.    INDICATION FOR PALLIATIVE CARE UNIT SERVICES: Goals of Care in the setting of Advanced Illness and Untreated Malignancy    INTERVAL HPI/OVERNIGHT EVENTS: Patient was noted to be recurrently febrile. Minimally responsive to stimuli this AM and not able to interact.    DNR on chart: Yes    ALLERGIES:  No Known Allergies    Intolerances    MEDICATIONS  (STANDING):  collagenase Ointment 1 Application(s) Topical daily  Dakins Solution - 1/4 Strength 1 Application(s) Topical daily  dextrose 5% + sodium chloride 0.9%. 1000 milliLiter(s) (75 mL/Hr) IV Continuous <Continuous>  gabapentin 400 milliGRAM(s) Oral at bedtime  heparin  Injectable 5000 Unit(s) SubCutaneous every 8 hours  melatonin 5 milliGRAM(s) Oral at bedtime  mirtazapine 7.5 milliGRAM(s) Oral at bedtime  pantoprazole    Tablet 40 milliGRAM(s) Oral before breakfast  piperacillin/tazobactam IVPB.. 3.375 Gram(s) IV Intermittent every 12 hours  predniSONE   Tablet 5 milliGRAM(s) Oral daily    MEDICATIONS  (PRN):  acetaminophen  Suppository .. 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  Source if other than patient:  [ x]Family   [x ]Team     Pain: [x] yes [ ] no - See PAINAD  QOL impact -    Location -                    Aggravating factors -  Quality -  Radiation -  Timing-  Severity (0-10 scale):  Minimal acceptable level (0-10 scale):     Dyspnea:                           [ ]Mild [ ]Moderate [ ]Severe  Anxiety:                             [ ]Mild [ ]Moderate [ ]Severe  Fatigue:                             [ ]Mild [ ]Moderate [ ]Severe  Nausea:                             [ ]Mild [ ]Moderate [ ]Severe  Loss of appetite:              [ ]Mild [ ]Moderate [ ]Severe  Constipation:                    [ ]Mild [ ]Moderate [ ]Severe    PAINAD Score: 2  [email or web address]/cog/painad.pdf (Ctrl +  left click to view)  		  Other Symptoms:  [x]All other review of systems negative     Palliative Performance Status Version 2:         20%         [email or web address]/files/news/palliative_performance_scale_[email or web address].pdf    PHYSICAL EXAM:  Vital Signs Last 24 Hrs  T(C): 36.6 (04 Mar 2020 07:53), Max: 38 (03 Mar 2020 17:01)  T(F): 97.8 (04 Mar 2020 07:53), Max: 100.4 (03 Mar 2020 17:01)  HR: 90 (04 Mar 2020 07:53) (90 - 93)  BP: 148/63 (04 Mar 2020 07:53) (96/46 - 148/63)  RR: 22 (04 Mar 2020 07:53) (20 - 22)  SpO2: 90% (04 Mar 2020 07:53) (90% - 97%) I&O's Summary    GENERAL:  [ ]Alert  [ ]Oriented x   [x]Lethargic  [ ]Cachexia  [ ]Unarousable  [ ]Verbal  [x]Non-Verbal  Behavioral:   [ ] Anxiety  [x] Delirium [ ] Agitation [ ] Other  HEENT:  [ ]Normal   [x]Dry mouth   [ ]ET Tube/Trach  [ ]Oral lesions  PULMONARY:   [x]Clear [ ]Tachypnea  [ ]Audible excessive secretions   [ ]Rhonchi        [ ]Right [ ]Left [ ]Bilateral  [ ]Crackles        [ ]Right [ ]Left [ ]Bilateral  [ ]Wheezing     [ ]Right [ ]Left [ ]Bilateral  [x]Diminshed BS [ ]Right [ ]Left [x]Bilateral    CARDIOVASCULAR:    [x]Regular [ ]Irregular [ ]Tachy  [ ]Michael [ ]Murmur [ ]Other  GASTROINTESTINAL:  [x]Soft  [ ]Distended   [x]+BS  [x]Non tender [ ]Tender  [ ]PEG [ ]OGT/ NGT   Last BM:?     GENITOURINARY:  [ ]Normal [ ] Incontinent   [x]Oliguria/Anuria   [ ]Soler  MUSCULOSKELETAL:   [ ]Normal   [ ]Weakness  [x]Bed/Wheelchair bound [ ]Edema  NEUROLOGIC:   [ ]No focal deficits  [x] Cognitive impairment  [ ] Dysphagia [ ]Dysarthria [ ] Paresis [ ]Other   SKIN:   [ ]Normal  [ ]Rash     [x]Pressure ulcer(s)  [x]y [ ]n  Present on admission - bilateral heels    CRITICAL CARE:  [ ] Shock Present  [ ]Septic [ ]Cardiogenic [ ]Neurologic [ ]Hypovolemic  [ ]  Vasopressors [ ]  Inotropes   [ ] Respiratory failure present [ ] Mechanical Ventilation [ ] Non-invasive ventilatory support [ ] High-Flow  [ ] Acute  [ ] Chronic [ ] Hypoxic  [ ] Hypercarbic [ ] Other  [x] Other organ failure: Renal    LABS:                        9.1    8.55  )-----------( 129      ( 04 Mar 2020 06:20 )             31.3     139  |  107  |  82<H>  ----------------------------<  172<H>  4.6   |  17<L>  |  4.23<H>    Ca    8.1<L>      04 Mar 2020 06:20  Phos  3.3     03-03  Mg     1.7     03-03    TPro  6.0  /  Alb  2.4<L>  /  TBili  0.4  /  DBili  x   /  AST  19  /  ALT  9<L>  /  AlkPhos  73  03-02      RADIOLOGY & ADDITIONAL STUDIES:  Xray Ankle Complete 3 Views, Right (03.03.20 @ 16:23)  Impression: Posttraumatic and degenerative changes as above. No definite radiographic evidence of osteomyelitis. However, MRI can be performed over the exact site of concern if there is persistent clinical suspicion for osteomyelitis.    Xray Foot AP + Lateral + Oblique, Bilat (03.03.20 @ 16:23)  Left foot: Loss of the soft tissues medially adjacent to the first interphalangeal joint with adjacent osseous erosion consistent with septicarthritis/osteomyelitis at the distal aspect of the first proximal phalanx and medial base of the first distal phalanx. Additional erosive changes as above which are age indeterminate; correlate for any history of an erosive arthropathy or previous osteomyelitis.    PROTEIN CALORIE MALNUTRITION: [ ] mild [ ] moderate [ ] severe  [ ] underweight [ ] morbid obesity  https://www.andeal.org/vault/2610/web/files/ONC/Table_Clinical%20Characteristics%20to%20Document%20Malnutrition-White%20JV%20et%20al%202012.pdf  Height (cm): 157.5 (11-07-19 @ 03:36)  Weight (kg): 90.9 (11-06-19 @ 22:27)  BMI (kg/m2): 36.6 (11-07-19 @ 03:36)    [x] PPSV2 < or = 30% [ ] significant weight loss [x] poor nutritional intake [ ] anasarca  [ ] Artificial Nutrition Albumin, Serum: 2.4 g/dL (03-02-20 @ 22:37)      REFERRALS:   [ ]Chaplaincy  [ ] Hospice  [ ]Child Life  [x]Social Work  [ ]Case management [ ]Holistic Therapy [ ] Physical Therapy [ ] Dietary

## 2020-03-04 NOTE — PROGRESS NOTE ADULT - SUBJECTIVE AND OBJECTIVE BOX
Patient is a 89y old  Female who presents with a chief complaint of Altered Mental Status (03 Mar 2020 15:35)       INTERVAL HPI/OVERNIGHT EVENTS:  Patient seen and evaluated at bedside.  Pt is resting comfortable in NAD. Denies N/V/F/C.    Allergies    No Known Allergies    Intolerances        Vital Signs Last 24 Hrs  T(C): 36.6 (04 Mar 2020 07:53), Max: 38 (03 Mar 2020 17:01)  T(F): 97.8 (04 Mar 2020 07:53), Max: 100.4 (03 Mar 2020 17:01)  HR: 90 (04 Mar 2020 07:53) (90 - 93)  BP: 148/63 (04 Mar 2020 07:53) (96/46 - 148/63)  BP(mean): --  RR: 22 (04 Mar 2020 07:53) (20 - 22)  SpO2: 90% (04 Mar 2020 07:53) (90% - 97%)    LABS:                        9.1    8.55  )-----------( 129      ( 04 Mar 2020 06:20 )             31.3     03-04    139  |  107  |  82<H>  ----------------------------<  172<H>  4.6   |  17<L>  |  4.23<H>    Ca    8.1<L>      04 Mar 2020 06:20  Phos  3.3     03-03  Mg     1.7     03-03    TPro  6.0  /  Alb  2.4<L>  /  TBili  0.4  /  DBili  x   /  AST  19  /  ALT  9<L>  /  AlkPhos  73  03-02      Urinalysis Basic - ( 02 Mar 2020 21:55 )    Color: Dark Yellow / Appearance: Turbid / S.025 / pH: x  Gluc: x / Ketone: Negative  / Bili: Negative / Urobili: Negative   Blood: x / Protein: 100 / Nitrite: Negative   Leuk Esterase: Large / RBC: >50 /hpf / WBC >50 /HPF   Sq Epi: x / Non Sq Epi: 5 / Bacteria: TNTC      CAPILLARY BLOOD GLUCOSE      POCT Blood Glucose.: 155 mg/dL (04 Mar 2020 07:52)  POCT Blood Glucose.: 153 mg/dL (04 Mar 2020 06:14)  POCT Blood Glucose.: 103 mg/dL (03 Mar 2020 23:43)  POCT Blood Glucose.: 82 mg/dL (03 Mar 2020 17:44)  POCT Blood Glucose.: 72 mg/dL (03 Mar 2020 13:29)      Lower Extremity Physical Exam:  Vascular: DP/PT 1/4, B/L, CFT <5 seconds B/L, Temperature gradient normal, B/L.   Neuro: Epicritic sensation diminished to the level of digits, B/L.  Musculoskeletal/Ortho: inverted ankle bl, loss of muscle strength to bl LEs   Skin:  Right LE - right medial ankle wound to subQ, 2x2x0.2cm, no signs of infection with granular base. Right lateral heel wound to bone with fibrotic tissue and malodor, [id number].   Left LE - left hallux wound to bone, [id number], with fibrotic tissue and malodor, IP joint is exposed with necrotic bone. 2nd toe wound to subQ, 2.5x2x0.2cm. Distal 4th toe eschar. Left heel stable, dry eschar intact, 5x5 full thickness.     RADIOLOGY & ADDITIONAL TESTS:

## 2020-03-04 NOTE — PROGRESS NOTE ADULT - PROBLEM SELECTOR PLAN 6
Chronic ulcers but noted to be malodorous  -Podiatry following, s/p bedside excision  -c/w Wound Care

## 2020-03-04 NOTE — PROGRESS NOTE ADULT - ASSESSMENT
89 yo F presents with bilateral extensive foot wounds  - pt was seen and evaluated   - Afebrile, WBC trending down to 8.55, ESR 91, CRP 36.68   - Ankle x-ray reviewed demonstrating no signs of OM  - Foot x-rays reviewed showing OM of L distal phalanx  - left hallux wound to bone with exposed IP joint and necrotic bone, malodorous. Left plantar heel dry stable eschar. Right medial ankle wound to subQ, lateral heel wound to bone with fibrotic tissue and malodor.  - s/p bedside excisional sharp debridement (3/3/20); incisional site demonstrating no further drainage, no malodor, no purulence  - Left hallux wound culture pending  - Continue local wound care with dakins & DSD to L hallux and R heel wound  - Santyl to remaining wounds with DSD  - will follow   - d/w attending

## 2020-03-05 DIAGNOSIS — K59.00 CONSTIPATION, UNSPECIFIED: ICD-10-CM

## 2020-03-05 LAB
-  AMPICILLIN: SIGNIFICANT CHANGE UP
-  TETRACYCLINE: SIGNIFICANT CHANGE UP
-  VANCOMYCIN: SIGNIFICANT CHANGE UP
METHOD TYPE: SIGNIFICANT CHANGE UP

## 2020-03-05 PROCEDURE — 99233 SBSQ HOSP IP/OBS HIGH 50: CPT | Mod: GC

## 2020-03-05 RX ORDER — VANCOMYCIN HCL 1 G
750 VIAL (EA) INTRAVENOUS ONCE
Refills: 0 | Status: COMPLETED | OUTPATIENT
Start: 2020-03-05 | End: 2020-03-05

## 2020-03-05 RX ADMIN — Medication 650 MILLIGRAM(S): at 11:31

## 2020-03-05 RX ADMIN — HEPARIN SODIUM 5000 UNIT(S): 5000 INJECTION INTRAVENOUS; SUBCUTANEOUS at 05:16

## 2020-03-05 RX ADMIN — Medication 1 APPLICATION(S): at 11:32

## 2020-03-05 RX ADMIN — Medication 0.25 MILLIGRAM(S): at 11:55

## 2020-03-05 RX ADMIN — HEPARIN SODIUM 5000 UNIT(S): 5000 INJECTION INTRAVENOUS; SUBCUTANEOUS at 21:28

## 2020-03-05 RX ADMIN — PIPERACILLIN AND TAZOBACTAM 25 GRAM(S): 4; .5 INJECTION, POWDER, LYOPHILIZED, FOR SOLUTION INTRAVENOUS at 05:16

## 2020-03-05 RX ADMIN — HEPARIN SODIUM 5000 UNIT(S): 5000 INJECTION INTRAVENOUS; SUBCUTANEOUS at 15:16

## 2020-03-05 RX ADMIN — Medication 250 MILLIGRAM(S): at 12:19

## 2020-03-05 RX ADMIN — PIPERACILLIN AND TAZOBACTAM 25 GRAM(S): 4; .5 INJECTION, POWDER, LYOPHILIZED, FOR SOLUTION INTRAVENOUS at 17:40

## 2020-03-05 RX ADMIN — SODIUM CHLORIDE 75 MILLILITER(S): 9 INJECTION, SOLUTION INTRAVENOUS at 23:18

## 2020-03-05 RX ADMIN — Medication 1 APPLICATION(S): at 11:31

## 2020-03-05 NOTE — PROGRESS NOTE ADULT - PROBLEM SELECTOR PLAN 4
In the setting of infection and poor PO intake  -c/w IVF for now  -will check labs every other day to guide prognosis Noted on XR in the setting of chronic wounds  -c/w Zosyn and Vanc (by level)  -f/u wound CX S&S (+enterococcus)  -family would not be accepting of surgical intervention if warranted  -appreciate Podiatry input

## 2020-03-05 NOTE — PROGRESS NOTE ADULT - ATTENDING COMMENTS
I have personally seen and examined this patient and agree with the above assessment and plan, which I have reviewed and edited where appropriate.     GOC: Management of symptoms in the setting of chronic, malodorous wounds and rising creatinine.    Symptoms: Pain, delirium  Disposition: Ongoing acute medical management, no invasive procedures.  Will reassess daily.  Likely hospice transition.    Met with family at bedside, reviewed medical condition and care goals.  They are clear that relief of suffering is paramount.
I have personally seen and examined this patient and agree with the above assessment and plan, which I have reviewed and edited where appropriate.     GOC : Antibiotics, fluids and symptom management  Symptoms: Pain, delirium  Disposition: Ongoing acute medical care, to monitor through the weekend and then re-evaluate - if no improvement then hospice evaluation for inpatient, although hospice can evaluate now.      Discussed with team and family.

## 2020-03-05 NOTE — PROGRESS NOTE ADULT - PROBLEM SELECTOR PLAN 5
Chronic ulcers but noted to be malodorous  -Podiatry following, s/p bedside excision  -c/w Wound Care In the setting of infection and poor PO intake  -c/w IVF for now  -will check labs every other day to guide prognosis In the setting of infection and poor PO intake  -c/w IVF for now, last creatinine up to > 4  -will check labs every other day to guide prognosis

## 2020-03-05 NOTE — PROGRESS NOTE ADULT - PROBLEM SELECTOR PLAN 6
n the setting of UTI, renal failure, LE wound infection/osteo  -CT Head w/o acute pathology  -c/w supportive care Chronic ulcers but noted to be malodorous  -Podiatry following, s/p bedside excision  -c/w Wound Care

## 2020-03-05 NOTE — PROGRESS NOTE ADULT - PROBLEM SELECTOR PLAN 8
Discussed lab and XR findings with family at bedside. They would like to give the patient through the weekend with the Abx and IVF to give her an opportunity to recover and potentially return to her NH w/ hospice referral. If the patient is not recovering over the weekend, they are amenable to an entirely symptom-directed focus and would likely d/c Abx/IVF and pursue hospice referral for potential inpatient.    See GOC Document from 3/3. PPSV = 20%, requires total assistance for all ADLs  -c/w supportive care

## 2020-03-05 NOTE — PROGRESS NOTE ADULT - PROBLEM SELECTOR PLAN 7
PPSV = 20%, requires total assistance for all ADLs  -c/w supportive care n the setting of UTI, renal failure, LE wound infection/osteo  -CT Head w/o acute pathology  -c/w supportive care

## 2020-03-05 NOTE — PROGRESS NOTE ADULT - ASSESSMENT
89yo F with PMH of Uterine CA (dx 2019, untreated), T2DM, HTN, and Chronic b/l Foot Ulcers p/w AMS in the setting of UTI +/- wound infection. Palliative consulted for GOC, transferred to PCU service. 89yo F with PMH of Uterine CA (dx 2019, untreated), T2DM, HTN, and Chronic b/l Foot Ulcers p/w AMS in the setting of UTI +/- wound infection. [facility] team consulted for GOC, transferred to PCU service.

## 2020-03-05 NOTE — PROGRESS NOTE ADULT - SUBJECTIVE AND OBJECTIVE BOX
[facility] TEAM PALLIATIVE CARE UNIT PROGRESS NOTE:      [  ] Patient on hospice program.    INDICATION FOR PALLIATIVE CARE UNIT SERVICES: Goals of Care in the setting of Advanced Illness and Untreated Malignancy    INTERVAL HPI/OVERNIGHT EVENTS: Patient was somewhat interactive with family yesterday afternoon, mumbling one word answers. Discussion with family regarding patient's clinical course was had yesterday, plan to continue Abx through the weekend. Patient not interactive this AM.    DNR on chart: Yes    ALLERGIES:  No Known Allergies    MEDICATIONS  (STANDING):  collagenase Ointment 1 Application(s) Topical daily  Dakins Solution - 1/4 Strength 1 Application(s) Topical daily  dextrose 5% + sodium chloride 0.9%. 1000 milliLiter(s) (75 mL/Hr) IV Continuous <Continuous>  gabapentin 400 milliGRAM(s) Oral at bedtime  heparin  Injectable 5000 Unit(s) SubCutaneous every 8 hours  melatonin 5 milliGRAM(s) Oral at bedtime  mirtazapine 7.5 milliGRAM(s) Oral at bedtime  pantoprazole    Tablet 40 milliGRAM(s) Oral before breakfast  piperacillin/tazobactam IVPB.. 3.375 Gram(s) IV Intermittent every 12 hours  predniSONE   Tablet 5 milliGRAM(s) Oral daily    MEDICATIONS  (PRN):  acetaminophen  Suppository .. 650 milliGRAM(s) Rectal every 6 hours PRN Temp greater or equal to 38C (100.4F)  bisacodyl Suppository 10 milliGRAM(s) Rectal daily PRN Constipation  HYDROmorphone  Injectable 0.25 milliGRAM(s) IV Push every 1 hour PRN Pain  LORazepam   Injectable 0.25 milliGRAM(s) IV Push every 4 hours PRN Agitation      ITEMS UNCHECKED ARE NOT PRESENT    PRESENT SYMPTOMS: [x]Unable to obtain due to poor mentation   Source if other than patient:  [ ]Family   [ ]Team     Pain: [x] yes [ ] no - See PAINAD  QOL impact -    Location -                    Aggravating factors -  Quality -  Radiation -  Timing-  Severity (0-10 scale):  Minimal acceptable level (0-10 scale):     Dyspnea:                           [ ]Mild [ ]Moderate [ ]Severe  Anxiety:                             [ ]Mild [ ]Moderate [ ]Severe  Fatigue:                             [ ]Mild [ ]Moderate [ ]Severe  Nausea:                             [ ]Mild [ ]Moderate [ ]Severe  Loss of appetite:              [ ]Mild [ ]Moderate [ ]Severe  Constipation:                    [ ]Mild [ ]Moderate [ ]Severe    PAINAD Score: 1  [email or web address] (Ctrl +  left click to view)  		  Other Symptoms:  [x]All other review of systems negative     Palliative Performance Status Version 2:         20%         [email or web address]    PHYSICAL EXAM:  Vital Signs Last 24 Hrs  T(C): 36.3 (05 Mar 2020 05:11), Max: 38.2 (04 Mar 2020 14:30)  T(F): 97.4 (05 Mar 2020 05:11), Max: 100.8 (04 Mar 2020 14:30)  HR: 106 (05 Mar 2020 05:11) (106 - 110)  BP: 126/80 (05 Mar 2020 05:11) (111/69 - 126/80)  BP(mean): --  RR: 16 (05 Mar 2020 05:11) (16 - 22)  SpO2: 94% (04 Mar 2020 21:23) (94% - 94%) I&O's Summary    GENERAL:  [ ]Alert  [ ]Oriented x   [x]Lethargic  [ ]Cachexia  [ ]Unarousable  [ ]Verbal  [x]Non-Verbal  Behavioral:   [ ] Anxiety  [x] Delirium [ ] Agitation [ ] Other  HEENT:  [ ]Normal   [x]Dry mouth   [ ]ET Tube/Trach  [ ]Oral lesions  PULMONARY:   [x]Clear [ ]Tachypnea  [ ]Audible excessive secretions   [ ]Rhonchi        [ ]Right [ ]Left [ ]Bilateral  [ ]Crackles        [ ]Right [ ]Left [ ]Bilateral  [ ]Wheezing     [ ]Right [ ]Left [ ]Bilateral  [x]Diminshed BS [ ]Right [ ]Left [x]Bilateral    CARDIOVASCULAR:    [x]Regular [ ]Irregular [ ]Tachy  [ ]Michael [ ]Murmur [ ]Other  GASTROINTESTINAL:  [x]Soft  [ ]Distended   [x]+BS  [x]Non tender [ ]Tender  [ ]PEG [ ]OGT/ NGT   Last BM:?     GENITOURINARY:  [ ]Normal [ ] Incontinent   [x]Oliguria/Anuria   [ ]Soler  MUSCULOSKELETAL:   [ ]Normal   [ ]Weakness  [x]Bed/Wheelchair bound [ ]Edema  NEUROLOGIC:   [ ]No focal deficits  [x] Cognitive impairment  [ ] Dysphagia [ ]Dysarthria [ ] Paresis [ ]Other   SKIN:   [ ]Normal  [ ]Rash     [x]Pressure ulcer(s)  [x]y [ ]n  Present on admission - bilateral heels    CRITICAL CARE:  [ ] Shock Present  [ ]Septic [ ]Cardiogenic [ ]Neurologic [ ]Hypovolemic  [ ]  Vasopressors [ ]  Inotropes   [ ] Respiratory failure present [ ] Mechanical Ventilation [ ] Non-invasive ventilatory support [ ] High-Flow  [ ] Acute  [ ] Chronic [ ] Hypoxic  [ ] Hypercarbic [ ] Other  [x] Other organ failure: Renal    LABS:                        9.1    8.55  )-----------( 129      ( 04 Mar 2020 06:20 )             31.3   03-04    139  |  107  |  82<H>  ----------------------------<  172<H>  4.6   |  17<L>  |  4.23<H>    Ca    8.1<L>      04 Mar 2020 06:20    RADIOLOGY & ADDITIONAL STUDIES: None New    PROTEIN CALORIE MALNUTRITION: [ ] mild [ ] moderate [ ] severe  [ ] underweight [ ] morbid obesity  https://www.andeal.org/vault/2440/web/files/ONC/Table_Clinical%20Characteristics%20to%20Document%20Malnutrition-White%20JV%20et%20al%409960.pdf  Height (cm): 157.5 (11-07-19 @ 03:36)  Weight (kg): 90.9 (11-06-19 @ 22:27)  BMI (kg/m2): 36.6 (11-07-19 @ 03:36)    [x] PPSV2 < or = 30% [ ] significant weight loss [x] poor nutritional intake [ ] anasarca  [ ] Artificial Nutrition Albumin, Serum: 2.4 g/dL (03-02-20 @ 22:37)      REFERRALS:   [ ]Chaplaincy  [ ] Hospice  [ ]Child Life  [x]Social Work  [ ]Case management [ ]Holistic Therapy [ ] Physical Therapy [ ] Dietary [facility] TEAM PALLIATIVE CARE UNIT PROGRESS NOTE:      [  ] Patient on hospice program.    INDICATION FOR PALLIATIVE CARE UNIT SERVICES: Goals of Care in the setting of Advanced Illness and Untreated Malignancy    INTERVAL HPI/OVERNIGHT EVENTS: Patient was somewhat interactive with family yesterday afternoon, mumbling one word answers. Discussion with family regarding patient's clinical course was had yesterday, plan to continue Abx through the weekend. Patient not interactive this AM.    DNR on chart: Yes    ALLERGIES:  No Known Allergies    MEDICATIONS  (STANDING):  collagenase Ointment 1 Application(s) Topical daily  Dakins Solution - 1/4 Strength 1 Application(s) Topical daily  dextrose 5% + sodium chloride 0.9%. 1000 milliLiter(s) (75 mL/Hr) IV Continuous <Continuous>  gabapentin 400 milliGRAM(s) Oral at bedtime  heparin  Injectable 5000 Unit(s) SubCutaneous every 8 hours  melatonin 5 milliGRAM(s) Oral at bedtime  mirtazapine 7.5 milliGRAM(s) Oral at bedtime  pantoprazole    Tablet 40 milliGRAM(s) Oral before breakfast  piperacillin/tazobactam IVPB.. 3.375 Gram(s) IV Intermittent every 12 hours  predniSONE   Tablet 5 milliGRAM(s) Oral daily    MEDICATIONS  (PRN):  acetaminophen  Suppository .. 650 milliGRAM(s) Rectal every 6 hours PRN Temp greater or equal to 38C (100.4F)  bisacodyl Suppository 10 milliGRAM(s) Rectal daily PRN Constipation  HYDROmorphone  Injectable 0.25 milliGRAM(s) IV Push every 1 hour PRN Pain  LORazepam   Injectable 0.25 milliGRAM(s) IV Push every 4 hours PRN Agitation      ITEMS UNCHECKED ARE NOT PRESENT    PRESENT SYMPTOMS: [x]Unable to obtain due to poor mentation   Source if other than patient:  [ ]Family   [x ]Team     Pain: [x] yes [ ] no - See PAINAD  QOL impact -    Location -                    Aggravating factors -  Quality -  Radiation -  Timing-  Severity (0-10 scale):  Minimal acceptable level (0-10 scale):     Dyspnea:                           [ ]Mild [ ]Moderate [ ]Severe  Anxiety:                             [ ]Mild [ ]Moderate [ ]Severe  Fatigue:                             [ ]Mild [ ]Moderate [ ]Severe  Nausea:                             [ ]Mild [ ]Moderate [ ]Severe  Loss of appetite:              [ ]Mild [ ]Moderate [ ]Severe  Constipation:                    [ ]Mild [ ]Moderate [ ]Severe    PAINAD Score: 1  [email or web address].[email or web address]/[email or web address] (Ctrl +  left click to view)  		  Other Symptoms:  [x]All other review of systems negative     Palliative Performance Status Version 2:         20%         [email or web address]_performance_[email or web address]_[email or web address]    PHYSICAL EXAM:  Vital Signs Last 24 Hrs  T(C): 36.3 (05 Mar 2020 05:11), Max: 38.2 (04 Mar 2020 14:30)  T(F): 97.4 (05 Mar 2020 05:11), Max: 100.8 (04 Mar 2020 14:30)  HR: 106 (05 Mar 2020 05:11) (106 - 110)  BP: 126/80 (05 Mar 2020 05:11) (111/69 - 126/80)  BP(mean): --  RR: 16 (05 Mar 2020 05:11) (16 - 22)  SpO2: 94% (04 Mar 2020 21:23) (94% - 94%) I&O's Summary    GENERAL:  [ ]Alert  [ ]Oriented x   [x]Lethargic  [ ]Cachexia  [ ]Unarousable  [ ]Verbal  [x]Non-Verbal  Behavioral:   [ ] Anxiety  [x] Delirium [ ] Agitation [ ] Other  HEENT:  [ ]Normal   [x]Dry mouth   [ ]ET Tube/Trach  [ ]Oral lesions  PULMONARY:   [x]Clear [ ]Tachypnea  [ ]Audible excessive secretions   [ ]Rhonchi        [ ]Right [ ]Left [ ]Bilateral  [ ]Crackles        [ ]Right [ ]Left [ ]Bilateral  [ ]Wheezing     [ ]Right [ ]Left [ ]Bilateral  [x]Diminshed BS [ ]Right [ ]Left [x]Bilateral    CARDIOVASCULAR:    [x]Regular [ ]Irregular [ ]Tachy  [ ]Michael [ ]Murmur [ ]Other  GASTROINTESTINAL:  [x]Soft  [ ]Distended   [x]+BS  [x]Non tender [ ]Tender  [ ]PEG [ ]OGT/ NGT   Last BM:?     GENITOURINARY:  [ ]Normal [ ] Incontinent   [x]Oliguria/Anuria   [ ]Soler  MUSCULOSKELETAL:   [ ]Normal   [ ]Weakness  [x]Bed/Wheelchair bound [ ]Edema  NEUROLOGIC:   [ ]No focal deficits  [x] Cognitive impairment  [ ] Dysphagia [ ]Dysarthria [ ] Paresis [ ]Other   SKIN:   [ ]Normal  [ ]Rash     [x]Pressure ulcer(s)  [x]y [ ]n  Present on admission - bilateral heels    CRITICAL CARE:  [ ] Shock Present  [ ]Septic [ ]Cardiogenic [ ]Neurologic [ ]Hypovolemic  [ ]  Vasopressors [ ]  Inotropes   [ ] Respiratory failure present [ ] Mechanical Ventilation [ ] Non-invasive ventilatory support [ ] High-Flow  [ ] Acute  [ ] Chronic [ ] Hypoxic  [ ] Hypercarbic [ ] Other  [x] Other organ failure: Renal    LABS:                        9.1    8.55  )-----------( 129      ( 04 Mar 2020 06:20 )             31.3   03-04    139  |  107  |  82<H>  ----------------------------<  172<H>  4.6   |  17<L>  |  4.23<H>    Ca    8.1<L>      04 Mar 2020 06:20    RADIOLOGY & ADDITIONAL STUDIES: None New    PROTEIN CALORIE MALNUTRITION: [ ] mild [ ] moderate [ ] severe  [ ] underweight [ ] morbid obesity  https://www.andeal.org/vault/2440/web/files/ONC/Table_Clinical%20Characteristics%20to%20Document%20Malnutrition-White%20JV%20et%20al%947194.pdf  Height (cm): 157.5 (11-07-19 @ 03:36)  Weight (kg): 90.9 (11-06-19 @ 22:27)  BMI (kg/m2): 36.6 (11-07-19 @ 03:36)    [x] PPSV2 < or = 30% [ ] significant weight loss [x] poor nutritional intake [ ] anasarca  [ ] Artificial Nutrition Albumin, Serum: 2.4 g/dL (03-02-20 @ 22:37)      REFERRALS:   [ ]Chaplaincy  [ ] Hospice  [ ]Child Life  [x]Social Work  [ ]Case management [ ]Holistic Therapy [ ] Physical Therapy [ ] Dietary

## 2020-03-05 NOTE — PROGRESS NOTE ADULT - PROBLEM SELECTOR PLAN 9
Discussed lab and XR findings with family at bedside. They would like to give the patient through the weekend with the Abx and IVF to give her an opportunity to recover and potentially return to her NH w/ hospice referral. If the patient is not recovering over the weekend, they are amenable to an entirely symptom-directed focus and would likely d/c Abx/IVF and pursue hospice referral for potential inpatient.    See GOC Document from 3/3. Discussed lab and XR findings with family at bedside. To monitor through the weekend with the Abx and IVF to give her an opportunity to recover and potentially return to her NH w/ hospice referral. If the patient is not recovering over the weekend, they are amenable to an entirely symptom-directed focus and would likely d/c Abx/IVF and pursue hospice referral for potential inpatient.    See GOC Document from 3/3.

## 2020-03-05 NOTE — PROGRESS NOTE ADULT - PROBLEM SELECTOR PLAN 2
Unclear etiology of hypoxia, no further workup as per HCP.  -c/w supplemental O2, wean as tolerated Unclear etiology of hypoxia, no further workup as per HCP.  -c/w supplemental O2, wean as tolerated  Dilaudid PRN

## 2020-03-05 NOTE — PROGRESS NOTE ADULT - PROBLEM SELECTOR PLAN 3
Noted on XR in the setting of chronic wounds  -c/w Zosyn and Vanc (by level)  -f/u wound CX S&S (+enterococcus)  -family would not be accepting of surgical intervention if warranted  -appreciate Podiatry input Unclear last BM, none while in PCU  -Dulcolax supp today Unclear last BM, none while in PCU  -Dulcolax supp today, evaluate daily

## 2020-03-05 NOTE — CHART NOTE - NSCHARTNOTEFT_GEN_A_CORE
Pt has bilateral extensive foot wounds, with left hallux osteomyelitis, exposed necrotic bone.   Family does not want surgery on the pateint. Continue local wound care with dakin and/ or santyl to wounds ( santyl with dakin to left hallux wound, and right heel wound. Santyl to the rest of the wounds).   Recommend ID consultation for Abx treatment for OM

## 2020-03-06 VITALS
RESPIRATION RATE: 22 BRPM | TEMPERATURE: 98 F | OXYGEN SATURATION: 84 % | SYSTOLIC BLOOD PRESSURE: 53 MMHG | HEART RATE: 128 BPM | DIASTOLIC BLOOD PRESSURE: 36 MMHG

## 2020-03-06 LAB
-  AMPICILLIN: SIGNIFICANT CHANGE UP
-  TETRACYCLINE: SIGNIFICANT CHANGE UP
-  VANCOMYCIN: SIGNIFICANT CHANGE UP
ANION GAP SERPL CALC-SCNC: 15 MMOL/L — SIGNIFICANT CHANGE UP (ref 5–17)
BUN SERPL-MCNC: 104 MG/DL — HIGH (ref 7–23)
CALCIUM SERPL-MCNC: 8.2 MG/DL — LOW (ref 8.4–10.5)
CHLORIDE SERPL-SCNC: 110 MMOL/L — HIGH (ref 96–108)
CO2 SERPL-SCNC: 17 MMOL/L — LOW (ref 22–31)
CREAT SERPL-MCNC: 5.51 MG/DL — HIGH (ref 0.5–1.3)
GLUCOSE SERPL-MCNC: 325 MG/DL — HIGH (ref 70–99)
METHOD TYPE: SIGNIFICANT CHANGE UP
POTASSIUM SERPL-MCNC: 5.4 MMOL/L — HIGH (ref 3.5–5.3)
POTASSIUM SERPL-SCNC: 5.4 MMOL/L — HIGH (ref 3.5–5.3)
SODIUM SERPL-SCNC: 142 MMOL/L — SIGNIFICANT CHANGE UP (ref 135–145)
VANCOMYCIN FLD-MCNC: 6.7 UG/ML — SIGNIFICANT CHANGE UP

## 2020-03-06 PROCEDURE — 83036 HEMOGLOBIN GLYCOSYLATED A1C: CPT

## 2020-03-06 PROCEDURE — 87070 CULTURE OTHR SPECIMN AEROBIC: CPT

## 2020-03-06 PROCEDURE — 84100 ASSAY OF PHOSPHORUS: CPT

## 2020-03-06 PROCEDURE — 87040 BLOOD CULTURE FOR BACTERIA: CPT

## 2020-03-06 PROCEDURE — 93923 UPR/LXTR ART STDY 3+ LVLS: CPT

## 2020-03-06 PROCEDURE — 73630 X-RAY EXAM OF FOOT: CPT

## 2020-03-06 PROCEDURE — 85027 COMPLETE CBC AUTOMATED: CPT

## 2020-03-06 PROCEDURE — 83735 ASSAY OF MAGNESIUM: CPT

## 2020-03-06 PROCEDURE — 85652 RBC SED RATE AUTOMATED: CPT

## 2020-03-06 PROCEDURE — 99233 SBSQ HOSP IP/OBS HIGH 50: CPT

## 2020-03-06 PROCEDURE — 87581 M.PNEUMON DNA AMP PROBE: CPT

## 2020-03-06 PROCEDURE — 87205 SMEAR GRAM STAIN: CPT

## 2020-03-06 PROCEDURE — 73610 X-RAY EXAM OF ANKLE: CPT

## 2020-03-06 PROCEDURE — 82962 GLUCOSE BLOOD TEST: CPT

## 2020-03-06 PROCEDURE — 87186 SC STD MICRODIL/AGAR DIL: CPT

## 2020-03-06 PROCEDURE — 96365 THER/PROPH/DIAG IV INF INIT: CPT

## 2020-03-06 PROCEDURE — 87486 CHLMYD PNEUM DNA AMP PROBE: CPT

## 2020-03-06 PROCEDURE — 86140 C-REACTIVE PROTEIN: CPT

## 2020-03-06 PROCEDURE — 70450 CT HEAD/BRAIN W/O DYE: CPT

## 2020-03-06 PROCEDURE — 80053 COMPREHEN METABOLIC PANEL: CPT

## 2020-03-06 PROCEDURE — 87633 RESP VIRUS 12-25 TARGETS: CPT

## 2020-03-06 PROCEDURE — 96375 TX/PRO/DX INJ NEW DRUG ADDON: CPT

## 2020-03-06 PROCEDURE — 71045 X-RAY EXAM CHEST 1 VIEW: CPT

## 2020-03-06 PROCEDURE — 81001 URINALYSIS AUTO W/SCOPE: CPT

## 2020-03-06 PROCEDURE — 93005 ELECTROCARDIOGRAM TRACING: CPT

## 2020-03-06 PROCEDURE — 99285 EMERGENCY DEPT VISIT HI MDM: CPT | Mod: 25

## 2020-03-06 PROCEDURE — 87798 DETECT AGENT NOS DNA AMP: CPT

## 2020-03-06 PROCEDURE — 80048 BASIC METABOLIC PNL TOTAL CA: CPT

## 2020-03-06 PROCEDURE — 87086 URINE CULTURE/COLONY COUNT: CPT

## 2020-03-06 PROCEDURE — 97162 PT EVAL MOD COMPLEX 30 MIN: CPT

## 2020-03-06 RX ORDER — HYDROMORPHONE HYDROCHLORIDE 2 MG/ML
0.5 INJECTION INTRAMUSCULAR; INTRAVENOUS; SUBCUTANEOUS
Refills: 0 | Status: DISCONTINUED | OUTPATIENT
Start: 2020-03-06 | End: 2020-03-06

## 2020-03-06 RX ORDER — HYDROMORPHONE HYDROCHLORIDE 2 MG/ML
0.25 INJECTION INTRAMUSCULAR; INTRAVENOUS; SUBCUTANEOUS EVERY 6 HOURS
Refills: 0 | Status: DISCONTINUED | OUTPATIENT
Start: 2020-03-06 | End: 2020-03-06

## 2020-03-06 RX ORDER — SODIUM CHLORIDE 9 MG/ML
1000 INJECTION INTRAMUSCULAR; INTRAVENOUS; SUBCUTANEOUS
Refills: 0 | Status: DISCONTINUED | OUTPATIENT
Start: 2020-03-06 | End: 2020-03-06

## 2020-03-06 RX ADMIN — Medication 1 APPLICATION(S): at 07:54

## 2020-03-06 RX ADMIN — HYDROMORPHONE HYDROCHLORIDE 0.25 MILLIGRAM(S): 2 INJECTION INTRAMUSCULAR; INTRAVENOUS; SUBCUTANEOUS at 09:57

## 2020-03-06 RX ADMIN — SODIUM CHLORIDE 20 MILLILITER(S): 9 INJECTION INTRAMUSCULAR; INTRAVENOUS; SUBCUTANEOUS at 09:55

## 2020-03-06 RX ADMIN — HYDROMORPHONE HYDROCHLORIDE 0.5 MILLIGRAM(S): 2 INJECTION INTRAMUSCULAR; INTRAVENOUS; SUBCUTANEOUS at 11:35

## 2020-03-06 RX ADMIN — HYDROMORPHONE HYDROCHLORIDE 0.25 MILLIGRAM(S): 2 INJECTION INTRAMUSCULAR; INTRAVENOUS; SUBCUTANEOUS at 03:22

## 2020-03-06 RX ADMIN — HYDROMORPHONE HYDROCHLORIDE 0.25 MILLIGRAM(S): 2 INJECTION INTRAMUSCULAR; INTRAVENOUS; SUBCUTANEOUS at 03:38

## 2020-03-06 RX ADMIN — HEPARIN SODIUM 5000 UNIT(S): 5000 INJECTION INTRAVENOUS; SUBCUTANEOUS at 05:19

## 2020-03-06 RX ADMIN — PIPERACILLIN AND TAZOBACTAM 25 GRAM(S): 4; .5 INJECTION, POWDER, LYOPHILIZED, FOR SOLUTION INTRAVENOUS at 05:19

## 2020-03-06 RX ADMIN — HYDROMORPHONE HYDROCHLORIDE 0.25 MILLIGRAM(S): 2 INJECTION INTRAMUSCULAR; INTRAVENOUS; SUBCUTANEOUS at 05:19

## 2020-03-06 RX ADMIN — HYDROMORPHONE HYDROCHLORIDE 0.5 MILLIGRAM(S): 2 INJECTION INTRAMUSCULAR; INTRAVENOUS; SUBCUTANEOUS at 13:15

## 2020-03-06 RX ADMIN — HYDROMORPHONE HYDROCHLORIDE 0.5 MILLIGRAM(S): 2 INJECTION INTRAMUSCULAR; INTRAVENOUS; SUBCUTANEOUS at 14:57

## 2020-03-06 RX ADMIN — SODIUM CHLORIDE 75 MILLILITER(S): 9 INJECTION, SOLUTION INTRAVENOUS at 07:41

## 2020-03-06 NOTE — PROVIDER CONTACT NOTE (OTHER) - ASSESSMENT
No response to external stimuli.  No spontaneous respirations.   No apical heart rate.   Negative pupillary response to light.

## 2020-03-06 NOTE — PROGRESS NOTE ADULT - SUBJECTIVE AND OBJECTIVE BOX
[facility] TEAM PALLIATIVE CARE UNIT PROGRESS NOTE:      [  ] Patient on hospice program.    INDICATION FOR PALLIATIVE CARE UNIT SERVICES: Goals of Care in the setting of Advanced Illness and Untreated Malignancy    INTERVAL HPI/OVERNIGHT EVENTS: Patient unresponsive and appears to be in the dying process. Intermittent labored breathing. Dilaudid prn x 2, Ativan prn x 1 and Tylenol suppository x 1 in the past 24 hours. Niece (HCP) at bedside and is aware she is in the dying process and wants full comfort care. Antibiotics discontinued and full comfort care ensued. Dilaudid started ATC for comfort and prn dose increased to 0.5 mg.  DNR on chart: Yes    ALLERGIES:  No Known Allergies    MEDICATIONS  (STANDING):  collagenase Ointment 1 Application(s) Topical daily  Dakins Solution - 1/4 Strength 1 Application(s) Topical daily  HYDROmorphone  Injectable 0.25 milliGRAM(s) IV Push every 6 hours  sodium chloride 0.9%. 1000 milliLiter(s) (20 mL/Hr) IV Continuous <Continuous>    MEDICATIONS  (PRN):  acetaminophen  Suppository .. 650 milliGRAM(s) Rectal every 6 hours PRN Temp greater or equal to 38C (100.4F)  bisacodyl Suppository 10 milliGRAM(s) Rectal daily PRN Constipation  HYDROmorphone  Injectable 0.5 milliGRAM(s) IV Push every 1 hour PRN pain  HYDROmorphone  Injectable 0.5 milliGRAM(s) IV Push every 1 hour PRN dyspnea  LORazepam   Injectable 0.5 milliGRAM(s) IV Push every 1 hour PRN Agitation    ITEMS UNCHECKED ARE NOT PRESENT    PRESENT SYMPTOMS: [x]Unable to obtain due to poor mentation   Source if other than patient:  [ ]Family   [x ]Team     Pain: [x] yes [ ] no - See PAINAD  QOL impact -    Location -                    Aggravating factors -  Quality -  Radiation -  Timing-  Severity (0-10 scale):  Minimal acceptable level (0-10 scale):     Dyspnea:                           [ ]Mild [ ]Moderate [ ]Severe  Anxiety:                             [ ]Mild [ ]Moderate [ ]Severe  Fatigue:                             [ ]Mild [ ]Moderate [ ]Severe  Nausea:                             [ ]Mild [ ]Moderate [ ]Severe  Loss of appetite:              [ ]Mild [ ]Moderate [ ]Severe  Constipation:                    [ ]Mild [ ]Moderate [ ]Severe    PAINAD Score: 4  [email or web address] (Ctrl +  left click to view)  		  Other Symptoms:  []All other review of systems negative     Palliative Performance Status Version 2:         10%         [email or web address]/[email or web address]_performance_scale_[email or web address]    PHYSICAL EXAM:  Vital Signs Last 24 Hrs  T(C): 36.7 (06 Mar 2020 07:14), Max: 36.8 (05 Mar 2020 21:02)  T(F): 98.1 (06 Mar 2020 07:14), Max: 98.2 (05 Mar 2020 21:02)  HR: 128 (06 Mar 2020 07:14) (128 - 128)  BP: 53/36 (06 Mar 2020 07:14) (53/36 - 53/36)  BP(mean): --  RR: 22 (06 Mar 2020 07:14) (22 - 22)  SpO2: 84% (06 Mar 2020 07:14) (84% - 84%)    GENERAL:  [ ]Alert  [ ]Oriented x   []Lethargic  [ ]Cachexia  [x ]Unarousable  [ ]Verbal  [x]Non-Verbal  Behavioral:   [ ] Anxiety  [x] Delirium [ ] Agitation [ ] Other  HEENT:  [ ]Normal   [x]Dry mouth   [ ]ET Tube/Trach  [ ]Oral lesions  PULMONARY:   [x]Clear [ ]Tachypnea  [ ]Audible excessive secretions   [ ]Rhonchi        [ ]Right [ ]Left [ ]Bilateral  [ ]Crackles        [ ]Right [ ]Left [ ]Bilateral  [ ]Wheezing     [ ]Right [ ]Left [ ]Bilateral  [x]Diminshed BS [ ]Right [ ]Left [x]Bilateral    CARDIOVASCULAR:    [x]Regular [ ]Irregular [ ]Tachy  [ ]Michael [ ]Murmur [ ]Other  GASTROINTESTINAL:  [x]Soft  [ ]Distended   [x]+BS  [x]Non tender [ ]Tender  [ ]PEG [ ]OGT/ NGT   Last BM:?     GENITOURINARY:  [ ]Normal [ ] Incontinent   [x]Oliguria/Anuria   [ ]Soler  MUSCULOSKELETAL:   [ ]Normal   [ ]Weakness  [x]Bed/Wheelchair bound [ ]Edema  NEUROLOGIC:   [ ]No focal deficits  [x] Cognitive impairment  [ ] Dysphagia [ ]Dysarthria [ ] Paresis [ ]Other   SKIN:   [ ]Normal  [ ]Rash     [x]Pressure ulcer(s)  [x]y [ ]n  Present on admission - bilateral heels    CRITICAL CARE:  [ ] Shock Present  [ ]Septic [ ]Cardiogenic [ ]Neurologic [ ]Hypovolemic  [ ]  Vasopressors [ ]  Inotropes   [ ] Respiratory failure present [ ] Mechanical Ventilation [ ] Non-invasive ventilatory support [ ] High-Flow  [ ] Acute  [ ] Chronic [ ] Hypoxic  [ ] Hypercarbic [ ] Other  [x] Other organ failure: Renal    LABS:                        9.1    8.55  )-----------( 129      ( 04 Mar 2020 06:20 )             31.3   03-04    139  |  107  |  82<H>  ----------------------------<  172<H>  4.6   |  17<L>  |  4.23<H>    Ca    8.1<L>      04 Mar 2020 06:20    RADIOLOGY & ADDITIONAL STUDIES: None New    PROTEIN CALORIE MALNUTRITION: [ ] mild [ ] moderate [ ] severe  [ ] underweight [ ] morbid obesity  https://www.andeal.org/vault/2440/web/files/ONC/Table_Clinical%20Characteristics%20to%20Document%20Malnutrition-White%20JV%20et%20al%496515.pdf  Height (cm): 157.5 (11-07-19 @ 03:36)  Weight (kg): 90.9 (11-06-19 @ 22:27)  BMI (kg/m2): 36.6 (11-07-19 @ 03:36)    [x] PPSV2 < or = 30% [ ] significant weight loss [x] poor nutritional intake [ ] anasarca  [ ] Artificial Nutrition Albumin, Serum: 2.4 g/dL (03-02-20 @ 22:37)      REFERRALS:   [ ]Chaplaincy  [ ] Hospice  [ ]Child Life  [x]Social Work  [ ]Case management [ ]Holistic Therapy [ ] Physical Therapy [ ] Dietary GAP TEAM PALLIATIVE CARE UNIT PROGRESS NOTE:      [  ] Patient on hospice program.    INDICATION FOR PALLIATIVE CARE UNIT SERVICES: Goals of Care in the setting of Advanced Illness and Untreated Malignancy    INTERVAL HPI/OVERNIGHT EVENTS: Patient unresponsive and appears to be in the dying process. Intermittent labored breathing. Dilaudid prn x 2, Ativan prn x 1 and Tylenol suppository x 1 in the past 24 hours. Niece (HCP) at bedside and is aware she is in the dying process and wants full comfort care. Antibiotics discontinued and full comfort care ensued. Dilaudid started ATC for comfort and prn dose increased to 0.5 mg.  DNR on chart: Yes    ALLERGIES:  No Known Allergies    MEDICATIONS  (STANDING):  collagenase Ointment 1 Application(s) Topical daily  Dakins Solution - 1/4 Strength 1 Application(s) Topical daily  HYDROmorphone  Injectable 0.25 milliGRAM(s) IV Push every 6 hours  sodium chloride 0.9%. 1000 milliLiter(s) (20 mL/Hr) IV Continuous <Continuous>    MEDICATIONS  (PRN):  acetaminophen  Suppository .. 650 milliGRAM(s) Rectal every 6 hours PRN Temp greater or equal to 38C (100.4F)  bisacodyl Suppository 10 milliGRAM(s) Rectal daily PRN Constipation  HYDROmorphone  Injectable 0.5 milliGRAM(s) IV Push every 1 hour PRN pain  HYDROmorphone  Injectable 0.5 milliGRAM(s) IV Push every 1 hour PRN dyspnea  LORazepam   Injectable 0.5 milliGRAM(s) IV Push every 1 hour PRN Agitation    ITEMS UNCHECKED ARE NOT PRESENT    PRESENT SYMPTOMS: [x]Unable to obtain due to poor mentation   Source if other than patient:  [ ]Family   [x ]Team     Pain: [x] yes [ ] no - See PAINAD  QOL impact -    Location -                    Aggravating factors -  Quality -  Radiation -  Timing-  Severity (0-10 scale):  Minimal acceptable level (0-10 scale):     Dyspnea:                           [ ]Mild [ ]Moderate [ ]Severe  Anxiety:                             [ ]Mild [ ]Moderate [ ]Severe  Fatigue:                             [ ]Mild [ ]Moderate [ ]Severe  Nausea:                             [ ]Mild [ ]Moderate [ ]Severe  Loss of appetite:              [ ]Mild [ ]Moderate [ ]Severe  Constipation:                    [ ]Mild [ ]Moderate [ ]Severe    PAINAD Score: 4  [email or web address]/painad.pdf (Ctrl +  left click to view)  		  Other Symptoms:  []All other review of systems negative     Palliative Performance Status Version 2:         10%         [email or web address]/files/news/palliative_performance_scale_[email or web address].pdf    PHYSICAL EXAM:  Vital Signs Last 24 Hrs  T(C): 36.7 (06 Mar 2020 07:14), Max: 36.8 (05 Mar 2020 21:02)  T(F): 98.1 (06 Mar 2020 07:14), Max: 98.2 (05 Mar 2020 21:02)  HR: 128 (06 Mar 2020 07:14) (128 - 128)  BP: 53/36 (06 Mar 2020 07:14) (53/36 - 53/36)  BP(mean): --  RR: 22 (06 Mar 2020 07:14) (22 - 22)  SpO2: 84% (06 Mar 2020 07:14) (84% - 84%)    GENERAL:  [ ]Alert  [ ]Oriented x   []Lethargic  [ ]Cachexia  [x ]Unarousable  [ ]Verbal  [x]Non-Verbal  Behavioral:   [ ] Anxiety  [] Delirium [ ] Agitation [ ] Other  HEENT:  [ ]Normal   [x]Dry mouth   [ ]ET Tube/Trach  [ ]Oral lesions  PULMONARY:   []Clear [x ]Tachypnea  [x ]Audible excessive secretions   [ ]Rhonchi        [ ]Right [ ]Left [ ]Bilateral  [ ]Crackles        [ ]Right [ ]Left [ ]Bilateral  [ ]Wheezing     [ ]Right [ ]Left [ ]Bilateral  [x]Diminshed BS [ ]Right [ ]Left [x]Bilateral    CARDIOVASCULAR:    [x]Regular [ ]Irregular [ ]Tachy  [ ]Michael [ ]Murmur [ ]Other  GASTROINTESTINAL:  [x]Soft  [ ]Distended   [x]+BS  [x]Non tender [ ]Tender  [ ]PEG [ ]OGT/ NGT   Last BM: unknown (s/p Dulcolax)     GENITOURINARY:  [ ]Normal [ ] Incontinent   [x]Oliguria/Anuria   [x ]Soler  MUSCULOSKELETAL:   [ ]Normal   [ ]Weakness  [x]Bed/Wheelchair bound [x ]Edema  NEUROLOGIC:   [ ]No focal deficits  [x] Cognitive impairment  [ ] Dysphagia [ ]Dysarthria [ ] Paresis [ ]Other   SKIN:   [ ]Normal  [ ]Rash     [x]Pressure ulcer(s)  [x]y [ ]n  Present on admission - bilateral heels    CRITICAL CARE:  [ ] Shock Present  [ ]Septic [ ]Cardiogenic [ ]Neurologic [ ]Hypovolemic  [ ]  Vasopressors [ ]  Inotropes   [ ] Respiratory failure present [ ] Mechanical Ventilation [ ] Non-invasive ventilatory support [ ] High-Flow  [ ] Acute  [ ] Chronic [ ] Hypoxic  [ ] Hypercarbic [ ] Other  [x] Other organ failure: Renal    LABS:                        9.1    8.55  )-----------( 129      ( 04 Mar 2020 06:20 )             31.3   03-04    139  |  107  |  82<H>  ----------------------------<  172<H>  4.6   |  17<L>  |  4.23<H>    Ca    8.1<L>      04 Mar 2020 06:20    RADIOLOGY & ADDITIONAL STUDIES: None New    PROTEIN CALORIE MALNUTRITION: [ ] mild [ ] moderate [ ] severe  [ ] underweight [ ] morbid obesity  https://www.andeal.org/vault/2440/web/files/ONC/Table_Clinical%20Characteristics%20to%20Document%20Malnutrition-White%20JV%20et%20al%746565.pdf  Height (cm): 157.5 (11-07-19 @ 03:36)  Weight (kg): 90.9 (11-06-19 @ 22:27)  BMI (kg/m2): 36.6 (11-07-19 @ 03:36)    [x] PPSV2 < or = 30% [ ] significant weight loss [x] poor nutritional intake [ ] anasarca  [ ] Artificial Nutrition Albumin, Serum: 2.4 g/dL (03-02-20 @ 22:37)      REFERRALS:   [ ]Chaplaincy  [ ] Hospice  [ ]Child Life  [x]Social Work  [ ]Case management [ ]Holistic Therapy [ ] Physical Therapy [ ] Dietary

## 2020-03-06 NOTE — DISCHARGE NOTE FOR THE EXPIRED PATIENT - HOSPITAL COURSE
89yo F with PMH of Uterine CA (dx 2019, untreated), T2DM, HTN, and Chronic b/l Foot Ulcers p/w AMS in the setting of UTI +/- wound infection. GAP team consulted for GOC, transferred to our service in the PCU.    Patient with declining mental status while in the PCU. Dilaudid and Ativan were being used prn to manage symptoms of dyspnea and agitation. Dilaudid was started ATC for comfort this morning. The patient's niece wanted to stop antibiotics and focus on comfort as patient was in the dying process this morning. Patient  in the PCU in the afternoon on 3/6/20.

## 2020-03-06 NOTE — PROGRESS NOTE ADULT - ASSESSMENT
89yo F with PMH of Uterine CA (dx 2019, untreated), T2DM, HTN, and Chronic b/l Foot Ulcers p/w AMS in the setting of UTI +/- wound infection. GAP team consulted for GOC, transferred to PCU service. 89yo F with PMH of Uterine CA (dx 2019, untreated), T2DM, HTN, and Chronic b/l Foot Ulcers p/w AMS in the setting of UTI +/- wound infection. GAP team consulted for GOC, transferred to PCU service.    Unresponsive and appears to be in the dying process on assessment. Intermittent periods of tachypnea. Decision made by radha for full comfort care. Dilaudid prn x 2, Ativan prn x 1 and Tylenol suppository x 1 in the past 24 hours. Dilaudid 0.25 mg ordered ATC to manage dyspnea and prn dose increased to 0.5 mg.

## 2020-03-06 NOTE — PROGRESS NOTE ADULT - PROBLEM SELECTOR PLAN 5
In the setting of infection and poor PO intake  -c/w IVF for now, last creatinine up to > 4  -will check labs every other day to guide prognosis PPSV = 10%, requires total assistance for all ADLs  -c/w supportive care

## 2020-03-06 NOTE — PROGRESS NOTE ADULT - PROBLEM SELECTOR PLAN 9
Discussed lab and XR findings with family at bedside. To monitor through the weekend with the Abx and IVF to give her an opportunity to recover and potentially return to her NH w/ hospice referral. If the patient is not recovering over the weekend, they are amenable to an entirely symptom-directed focus and would likely d/c Abx/IVF and pursue hospice referral for potential inpatient.    See GOC Document from 3/3.

## 2020-03-06 NOTE — PROGRESS NOTE ADULT - PROBLEM SELECTOR PLAN 3
Unclear last BM, none while in PCU  -Dulcolax supp today, evaluate daily Unclear last BM, none while in PCU.  -Dulcolax suppository yesterday with no result. Will continue daily prn.

## 2020-03-06 NOTE — PROGRESS NOTE ADULT - PROBLEM SELECTOR PLAN 1
-c/w Dilaudid 0.25mg IV q1h PRN for Pain, required 1 PRN in past 24hrs Possible non-verbal indicators of pain. Dilaudid prn x 2 in the past 24 hours. Dilaudid 0.25 mg IV q 6 ATC and Dilaudid prn increased to 0.5 mg. Intermittent periods of tachypnea. Patient appears to be in the dying process. Dilaudid 0.25 mg ordered IV ATC and prn dose increased to 0.5 mg. Niece at bedside and wants full comfort care.

## 2020-03-06 NOTE — DISCHARGE NOTE PROVIDER - NSDCMRMEDTOKEN_GEN_ALL_CORE_FT
Basaglar KwikPen 100 units/mL subcutaneous solution: 32 unit(s) subcutaneous once a day (at bedtime)  gabapentin 600 mg oral tablet: 1 tab(s) orally 4 times a day  Melatonin 5 mg oral tablet: 2 tab(s) orally once a day (at bedtime)  metoprolol succinate 100 mg oral capsule, extended release: 1 cap(s) orally once a day  mirtazapine 7.5 mg oral tablet: 1 tab(s) orally once a day (at bedtime)  omeprazole 20 mg oral delayed release capsule: 1 cap(s) orally once a day  oxyCODONE 5 mg oral tablet: 1 tab(s) orally once a day (at bedtime)  predniSONE 5 mg oral tablet: 1 tab(s) orally once a day  ramipril 10 mg oral capsule: 1 cap(s) orally once a day

## 2020-03-06 NOTE — DISCHARGE NOTE FOR THE EXPIRED PATIENT - SECONDARY DIAGNOSIS.
Acute kidney injury Sepsis, due to unspecified organism, unspecified whether acute organ dysfunction present Osteomyelitis of left foot, unspecified type Wounds, multiple open, lower extremity Functional quadriplegia Pain Other constipation Palliative care encounter

## 2020-03-06 NOTE — PROGRESS NOTE ADULT - PROBLEM SELECTOR PLAN 4
Noted on XR in the setting of chronic wounds  -c/w Zosyn and Vanc (by level)  -f/u wound CX S&S (+enterococcus)  -family would not be accepting of surgical intervention if warranted  -appreciate Podiatry input Noted on XR in the setting of chronic wounds. Zosyn and Vancomycin discontinued as per request from the niece. No benefit of antibiotics at this time given patient's current condition.

## 2020-03-06 NOTE — PROGRESS NOTE ADULT - PROBLEM SELECTOR PLAN 2
Unclear etiology of hypoxia, no further workup as per HCP.  -c/w supplemental O2, wean as tolerated  Dilaudid PRN Intermittent periods of tachypnea. Patient appears to be in the dying process. Dilaudid 0.25 mg ordered IV ATC and prn dose increased to 0.5 mg. Niece at bedside and wants full comfort care. Possible non-verbal indicators of pain. Dilaudid prn x 2 in the past 24 hours. Dilaudid 0.25 mg IV q 6 ATC and Dilaudid prn increased to 0.5 mg.

## 2020-03-06 NOTE — DISCHARGE NOTE PROVIDER - NSDCFUADDINST_GEN_ALL_CORE_FT
Podiatry Discharge Instructions:  Follow up: Please follow up with Dr. Potts within 1 week of discharge from the hospital, please call [phone] for appointment and discuss that you recently were seen in the hospital.  Wound Care: Please apply dakin and santyl to the left hallux and 2nd toe wounds, to the right heel wounds; santyl to the left heel eschar, and right medial ankle wound. Dress with 4x4 gauze, kerlex daily. Pt to wear Z-flow when in bed   Weight bearing: patient is not ambulatory   Antibiotics: Please continue as instructed.

## 2020-03-06 NOTE — PROGRESS NOTE ADULT - PROBLEM SELECTOR PLAN 6
Chronic ulcers but noted to be malodorous  -Podiatry following, s/p bedside excision  -c/w Wound Care Patient unresponsive and hypotensive this morning. Niece called by primary RN and she is aware patient is likely in the dying process. She would like full comfort care at this time and de-escalation of non-comfort medications such as antibiotics.    Supportive care administered.

## 2020-03-07 DIAGNOSIS — R53.2 FUNCTIONAL QUADRIPLEGIA: ICD-10-CM

## 2020-03-08 LAB
CULTURE RESULTS: SIGNIFICANT CHANGE UP
CULTURE RESULTS: SIGNIFICANT CHANGE UP
ORGANISM # SPEC MICROSCOPIC CNT: SIGNIFICANT CHANGE UP
SPECIMEN SOURCE: SIGNIFICANT CHANGE UP
SPECIMEN SOURCE: SIGNIFICANT CHANGE UP

## 2021-10-04 NOTE — PATIENT PROFILE ADULT - SAFE PLACE TO LIVE
[facility] INPATIENT ENCOUNTER  DAILY PROGRESS NOTE    Date:  10/4/2021, Hospital Day: 15  Attending Physician:  Khalif Lo MD     SUBJECTIVE     HISTORY OF PRESENT ILLNESS:       Laura Mcallister is a 83 year old female patient with a past medical history significant for past medical history significant for atrial fibrillation on warfarin, murmur with confirmed aortic stenosis by ECHO, CKD III, C. Diff diarrhea and recent hernia repair admitted for acute CHF exacerbation.     Interval History: SP Thoracentesis x2. Sp [id number].  Pleural fluid without evidence of malignancy. Pt remains on Optiflow.     Today, pt discussed her ongoing symptoms including weakness and dyspnea.  Discussed CRT with pt and pt does not desire at this time.  Discussed if pt desires comfort care measures and she wished to further discuss this with her son present.  Pt complained of SOB and feelings of fatigue.  Pt denied CP, palpitations, or abd pain.      HISTORIES:     I have reviewed the past medical history, family history, social history, medications, and allergies listed in the medical record as obtained by nursing and support staff and agree with their documentation.    OBJECTIVE     VITAL SIGNS:    /72 (BP Location: LUE - Left upper extremity, Patient Position: Semi-Cheney's)   Pulse (!) 106   Temp 97.9 °F (36.6 °C) (Axillary)   Resp 20   Ht 5' 4\" (1.626 m)   Wt 85 kg (187 lb 6.3 oz)   BMI 32.17 kg/m²   BSA 1.9 m²     Intake/Output  Report      10/03 0700 - 10/04 0659    P.O. 100    I.V. 1582.2    Total Intake 1682.2    Urine 425    Stool 0    Total Output 425    Net +1257.2         Urine Occurrence 3 x    Stool Occurrence 6 x        Admission weight: 93.7 kg (206 lb 9.6 oz), Weight change: 1.2 kg (2 lb 10.3 oz)     PHYSICAL EXAM:    GENERAL:  Alert, cooperative, no distress, appears stated age.  Fatigued.  HEENT:  Heated high-flow nasal cannula in place.  Sclerae white, conjunctivae clear, no oropharyngeal erythema  or lesions. Moist mucous membranes.  NECK:  Supple, symmetrical, trachea midline.  CARDIO: Irregularly irregular.  Tachycardic.  Normal S1, S2.  Systolic murmur 2/6. No gallops or rubs.  Strong pulses in all extremities.  LUNGS:  On optiflow. Crackles over BL lower lung bases L>R.  Poor air movement.  Decreased aeration at the bilateral bases today  ABDOMEN:  Ventral abdomen with mild fullness.  Abdomen non tender.  Abd non distended.  Normal bowel sounds.  No rebound tenderness or guarding.  Dressing without shobha discharge.  EXTREMITIES:  Range of motion within normal limits.  Warm and well perfused.  No clubbing or cyanosis.  Trace pitting edema to mid shin BL LE.  SKIN:  Skin color, texture, turgor normal.   NEUROLOGIC:  Cranial nerves 2-12 are grossly intact. Sensation grossly intact throughout bilateral upper and lower extremities.  Strength 5/5 bilateral upper and lower extremities.    PSYCH:  Normal affect- somewhat fatigued.  Thought process and judgment appropriate. Mood is somewhat down and indifferent but improved from yesterday    LABORATORY DATA:    Recent Labs   Lab 10/04/21  0437 10/03/21  0603   SODIUM 139 139   POTASSIUM 3.2* 3.2*   CHLORIDE 103 102   CO2 20* 20*   BUN 71* 64*   CREATININE 5.80* 5.59*   GLUCOSE 104* 108*   BILIRUBIN 0.7 0.6   AST 11 14   GPT 6 9     Recent Labs   Lab 10/04/21  0437 10/03/21  0603   WBC 8.7 8.3   HGB 9.1* 9.4*   HCT 28.4* 29.0*    282     No results available in last 24 hours  Recent Labs   Lab 10/04/21  0437 10/03/21  0603 09/30/21  0430   ALBUMIN 3.0* 2.9*  --    CALCIUM 7.9* 7.9* 8.0*   MG  --   --  1.9       MICROBIOLOGY:  MRSA not detected  Respiratory panel negative    IMAGING STUDIES:      US Renal Duplex Only   Final Result   IMPRESSION:   1. Echogenic renal cortex present bilaterally which is nonspecific but can   be seen with medical renal disease. This unchanged the prior study.   2. The exam is technically challenging as described. The proximal  and mid   left renal artery are not visualized be evaluated.   3. Duplex evaluation is otherwise unremarkable.   4. Cholelithiasis.               XR CHEST AP OR PA   Final Result   IMPRESSION: No pneumothorax. Reduction in amount of left effusion.          US Chest   Final Result   FINDINGS/IMPRESSION:        Targeted ultrasound of the thorax demonstrates moderate bilateral pleural   effusions.         XR CHEST AP OR PA   Final Result   IMPRESSION:  No change since prior.      XR CHEST AP OR PA   Final Result   FINDINGS/IMPRESSION:   No evidence for right-sided pneumothorax following the thoracentesis.      There is generally stable blunting of the bilateral costophrenic angles   consistent with effusions.      Adjacent atelectasis or early infiltrate in the lung bases is stable.       There is central pulmonary vascular prominence and enlargement of the heart   consistent with fluid overload or vascular congestion.      XR CHEST AP OR PA   Final Result   FINDINGS/IMPRESSION:        The heart is enlarged, similar to prior exam. Tortuous thoracic aorta.   Prominent central pulmonary vasculature.      Similar appearance of small to moderate-sized bilateral pleural effusions.   No evidence of pneumothorax.      Hazy airspace opacities throughout the mid to lower lungs are slightly more   conspicuous compared to prior exam.            CT CHEST WO CONTRAST   Final Result   IMPRESSION:   1.  Moderate size bilateral pleural effusions. Patchy groundglass areas of   increased attenuation in the right and left upper lobe may be related to   pneumonia. Bilateral interstitial prominence also seen, nonspecific, and   could in part be related to atelectasis or additional infiltrative changes.      2.  Left thyroid nodule. Laboratory correlation and consideration to   dedicated thyroid ultrasound study recommended for further evaluation.      3.  Other findings as discussed above.      XR CHEST AP OR PA   Final Result    FINDINGS/IMPRESSION: Patchy bilateral opacities in the lungs, most   significant in the right lower lobe and right perihilar region, are   generally stable. Some retrocardiac airspace disease remains present and   grossly stable.      There are probable bilateral effusions, slightly greater on the right.      No pneumothorax.      Stable cardiac silhouette with aortic calcifications.      XR CHEST AP OR PA   Final Result   IMPRESSION:   1.  Persistent moderate perihilar patchy opacities and haziness with   underlying vascular congestion.      2.  Slightly worsened basilar haziness likely due to a combination of   effusions and atelectasis.      US Renal Complete (Comp Urinary System)   Final Result   IMPRESSION:   1. There is no ultrasound evidence to suggest obstructive uropathy.   2. Diffuse increased echogenicity of the renal cortex consistent with   underlying medical renal disease.   3. No evidence of urolithiasis.   4. Cholelithiasis.      XR CHEST AP OR PA   Final Result   IMPRESSION:       1. Slight interval progression in airspace disease particularly within the   left midlung. An underlying infectious etiology needs to be considered.   2. Stable mild pulmonary venous hypertension and associated cardiomegaly.         XR CHEST AP OR PA   Final Result   FINDINGS/IMPRESSION:        There is stable borderline prominence of the heart size and central   vasculature, which can be seen with cardiac decompensation. There is stable   mild diffuse interstitial prominence. There are persistent strandy   densities in the bilateral mid to lower lungs that may or present chronic   atelectasis, scarring, or infiltrate.         CT ABDOMEN PELVIS WO CONTRAST   Final Result   IMPRESSION:        1.  Fluid collection in the anterior abdominal wall has decreased in size.   2.  Resolution of the free intraperitoneal fluid.   3.  Duodenal inflammation has resolved.   4.  New moderate pleural effusions with bibasilar atelectasis.    5.  Cholelithiasis.            XR CHEST AP OR PA - PORTABLE   Final Result   FINDINGS and IMPRESSION:      1. There is cephalization of pulmonary blood flow and indistinctness of the   pulmonary vasculature suggestive of fluid overload/early and mild CHF   pattern.        Small bilateral pleural effusions are present and is also fluid within the   LEFT major fissure.      2. There is stable enlargement of the cardiac/pericardial silhouette.      3. No evidence for mass effect on the trachea.            XR CHEST AP OR PA    (Results Pending)   XR Chest PA and Lateral    (Results Pending)       ASSESSMENT & PLAN     Principal Problem:    Acute exacerbation of CHF (congestive heart failure) (CMS/[facility])  Active Problems:    Clostridium difficile colitis  Resolved Problems:    * No resolved hospital problems. *    Acute hypoxic respiratory failure due to  Acute on chronic CHF with preserved ejection fraction  Bilateral pleural effusion status post bilateral thoracentesis - LVEF assessment: 60%.  Admission BNP: 9015.S/p Thoracentesis 9/29 with >500mL fluid removed from right lung. Sp thoracentesis from left lung with >700cc fluid removed.  During the procedure pt became bradycardic and had 8s long run of asystole.   - Cardiology on consult-appreciate recommendations  - Pulmonology on consult-appreciate recommendations  - INR 4.1 this am  - CXR deomnstrated BL pleural effusions worse from previous study  - Lasix 60mg IV BID  - Pleural fluid culture without evidence of malignancy  - on Optiflox  - Zosyn 3.375g q12 adjusted for weight and renal dosing- day #5    Normocytic anemia status post transfusion - Status post total 2 units packed red blood cells  - Hgb 9.1 this am  - Ferrous Sulfate  - Stool is guaiac negative  - Continue to monitor closely    Acute renal failure on stage 3 chronic kidney disease, worse - Likely multifactorial etiology  - Continue to monitor closely  - 60mg IV lasix BID secondary to volume  overload  - PO bicarb 650mg qid   - mIVF 50mL/hr  - Avoid nephrotoxins  - Nephrology onboard, appreciate recs  - sp transfusion x2  - Pt does not desire CRT at this time- discussed options- will discuss with her son when he comes in again later today    Atrial fibrillation, chronic  Cardiac pauses, resolved   - INR supratherapeutic PTA and home Coumadin held but restarted during admission.  Coumadin held for thoracentesis 9/29  - tachycardic this am  - Tele  - Metorprolol tartrate 50mg q12h  - Cardiology on consult, appreciate recs- d/w Dr Lu  - Coumadin with pharmacy to dose, INR 4.1 this am  - discussed possibility of pacemaker with Cardiology if unable to control rate without bradycardia    Moderate aortic stenosis  - Cardiology on consult, appreciate recs  - Repeat read of echo suggests that aortic stenosis is more moderate than severe  - Continue to monitor   - Fup Cardiology OP    Hypokalemia - Likely due to diuretics  - Potassium supplement protocol in place  - K 3.2 this am  - Potassium supplement this am    Generalized weakness, worse - Normal neuro exam, a&ox3.  Likely 2/2 to continued deconditioning from hospitalization  - PT and OT as tolerated  - fall precautions  - optimize nutrition  - Will discuss discharge options with patient's son today    Recent abdominal wall hernia repair with abscess  - CT imaging as above  - Minimal drainage on dressing this am  - continue local wound care    Goals of care discussion:  Discussed CRT with patient and patient seemed to dislike the idea of undergoing dialysis.  Patient was interested in pursuing comfort care/hospice but would like further discussions to be done when her son is present.  Patient repeatedly asked what would the provider do/recommend, seeming to desire someone to make the decision for her.      Chronic medical problems/resolved hospital problems     Subxiphoid pain, resolved - troponin negative x3. EKG without acute change. Suspect the  etiology is GI rather than cardiac  - PPI     Essential hypertension  - PTA medications  - Continue to monitor    FEN/GI:    IVF:   • sodium chloride 0.9% infusion 75 mL/hr at 10/04/21 0019   • sodium chloride 0.9% infusion Stopped (09/30/21 1940)   • sodium chloride 0.9% infusion       Electrolytes: replacement per protocol, N/A for dialysis patients  Diet:   Cardiac Diet  2 Times/day W Breakfast & Dinner; Ensure Clear Berry/clear Liquid Supplement, Berry Oral Nutrition Supplement.  Lines/Drains/Access: peripheral IV  Bowel Regimen: simethicon    DVT/VTE Prophylaxis:  VTE Pharmacologic Prophylaxis: Coumadin  VTE Mechanical Prophylaxis:  SCDs & TEDs    CODE STATUS Selective Treatment/DNR    Disposition:  Pending Clinical improvement    This patient was discussed with Khalif Lo MD.    Ruthy Shen MD, MPH  PGY-2    Teaching Physician Attestation    I saw and evaluated the patient independently as the attending physician.  I personally reviewed the resident physician note and the patient's data including vital signs and other diagnostic testing performed.  I agree with the Resident's documented findings and plan of care as noted above and have included changes/additions in the body of the note  Khalif Lo MD  10/4/2021   no

## 2023-03-29 NOTE — PATIENT PROFILE ADULT - ARE SIGNIFICANT INDICATORS COMPLETE.
DATE OF SERVICE: 03-29-23 @ 06:57    Subjective: Patient seen and examined. No new events except as noted.     SUBJECTIVE/ROS:  nad      MEDICATIONS:  MEDICATIONS  (STANDING):  atenolol  Tablet 12.5 milliGRAM(s) Oral daily  cefepime   IVPB      cefepime   IVPB 2000 milliGRAM(s) IV Intermittent every 8 hours  dextrose 5%. 1000 milliLiter(s) (50 mL/Hr) IV Continuous <Continuous>  escitalopram 10 milliGRAM(s) Oral daily  famotidine    Tablet 20 milliGRAM(s) Oral daily  magnesium oxide 400 milliGRAM(s) Oral three times a day with meals  polyethylene glycol 3350 17 Gram(s) Oral daily  senna 2 Tablet(s) Oral at bedtime  simvastatin 40 milliGRAM(s) Oral at bedtime  traZODone 25 milliGRAM(s) Oral at bedtime      PHYSICAL EXAM:  T(C): 36.6 (03-29-23 @ 06:30), Max: 37 (03-28-23 @ 19:30)  HR: 94 (03-29-23 @ 06:30) (83 - 105)  BP: 117/60 (03-29-23 @ 06:30) (98/57 - 117/60)  RR: 19 (03-29-23 @ 06:30) (17 - 20)  SpO2: 98% (03-29-23 @ 06:30) (95% - 98%)  Wt(kg): --  I&O's Summary    27 Mar 2023 07:01  -  28 Mar 2023 07:00  --------------------------------------------------------  IN: 400 mL / OUT: 1150 mL / NET: -750 mL    28 Mar 2023 07:01  -  29 Mar 2023 06:57  --------------------------------------------------------  IN: 150 mL / OUT: 390 mL / NET: -240 mL            JVP: Normal  Neck: supple  Lung: clear   CV: S1 S2 , Murmur:  Abd: soft  Ext: No edema  neuro: Awake / alert  Psych: flat affect  Skin: normal``    LABS/DATA:    CARDIAC MARKERS:                                9.0    6.60  )-----------( 351      ( 28 Mar 2023 05:35 )             29.1     03-28    133<L>  |  96<L>  |  8   ----------------------------<  101<H>  3.9   |  27  |  0.54    Ca    8.5      28 Mar 2023 05:35  Phos  2.2     03-28  Mg     1.80     03-28      proBNP:   Lipid Profile:   HgA1c:   TSH:     TELE:  EKG:         No Yes

## 2024-03-09 NOTE — ED ADULT NURSE NOTE - NSIMPLEMENTINTERV_GEN_ALL_ED
Implemented All Universal Safety Interventions:  [place] to call system. Call bell, personal items and telephone within reach. Instruct patient to call for assistance. Room bathroom lighting operational. Non-slip footwear when patient is off stretcher. Physically safe environment: no spills, clutter or unnecessary equipment. Stretcher in lowest position, wheels locked, appropriate side rails in place. sinus bradycardia

## 2024-04-19 NOTE — ED ADULT NURSE REASSESSMENT NOTE - NS ED NURSE REASSESS COMMENT FT1
Pt complaining of pain, pt remains A&Ox1 at this time, disoriented to time and place. MD Hays and MD Corcoran at bedside ordered pain medication, pt provided with meds. Pt provided with fluids for hypotension and tachycardia. Pt family reports it is okay for pt to be admitted to the hospital at this time, originally family did not want an admission but due to hypoxia off oxygen and confusion, pt family agreed to admission for antibiotics and fluids at this time. Safety and comfort measures provided, bed locked and in lowest position, side rails up for safety. Call bell within reach. Family remains at bedside. Awaiting disposition. No